# Patient Record
Sex: FEMALE | Race: WHITE | NOT HISPANIC OR LATINO | Employment: FULL TIME | URBAN - METROPOLITAN AREA
[De-identification: names, ages, dates, MRNs, and addresses within clinical notes are randomized per-mention and may not be internally consistent; named-entity substitution may affect disease eponyms.]

---

## 2019-05-15 ENCOUNTER — PATIENT OUTREACH (OUTPATIENT)
Dept: ADMINISTRATIVE | Facility: HOSPITAL | Age: 54
End: 2019-05-15

## 2019-05-29 PROBLEM — M67.90 TENDINOPATHY: Status: ACTIVE | Noted: 2019-05-29

## 2019-05-29 PROBLEM — E66.9 OBESITY, CLASS II, BMI 35-39.9: Status: ACTIVE | Noted: 2019-05-29

## 2019-05-29 PROBLEM — E66.812 OBESITY, CLASS II, BMI 35-39.9: Status: ACTIVE | Noted: 2019-05-29

## 2019-05-29 PROBLEM — Z72.0 TOBACCO ABUSE: Status: ACTIVE | Noted: 2019-05-29

## 2019-05-29 PROBLEM — E78.00 PURE HYPERCHOLESTEROLEMIA: Status: ACTIVE | Noted: 2019-05-29

## 2019-05-29 PROBLEM — M77.32 CALCANEAL SPUR OF LEFT FOOT: Status: ACTIVE | Noted: 2019-05-29

## 2019-09-06 PROBLEM — Z12.11 COLON CANCER SCREENING: Status: ACTIVE | Noted: 2019-09-06

## 2019-12-02 PROBLEM — R03.0 ELEVATED BLOOD PRESSURE READING: Status: ACTIVE | Noted: 2019-12-02

## 2019-12-02 PROBLEM — M76.62 LEFT ACHILLES TENDINITIS: Status: ACTIVE | Noted: 2019-12-02

## 2020-05-04 PROBLEM — F33.1 MODERATE EPISODE OF RECURRENT MAJOR DEPRESSIVE DISORDER: Status: ACTIVE | Noted: 2020-05-04

## 2020-07-13 ENCOUNTER — HOSPITAL ENCOUNTER (OUTPATIENT)
Dept: SLEEP MEDICINE | Facility: HOSPITAL | Age: 55
Discharge: HOME | End: 2020-07-13
Attending: NURSE PRACTITIONER
Payer: MEDICAID

## 2020-07-22 DIAGNOSIS — Z11.59 SPECIAL SCREENING EXAMINATION FOR UNSPECIFIED VIRAL DISEASE: ICD-10-CM

## 2020-07-29 DIAGNOSIS — Z11.59 SPECIAL SCREENING EXAMINATION FOR UNSPECIFIED VIRAL DISEASE: ICD-10-CM

## 2020-08-01 ENCOUNTER — LAB VISIT (OUTPATIENT)
Dept: INTERNAL MEDICINE | Facility: CLINIC | Age: 55
End: 2020-08-01
Payer: MEDICAID

## 2020-08-01 DIAGNOSIS — Z11.59 SPECIAL SCREENING EXAMINATION FOR UNSPECIFIED VIRAL DISEASE: ICD-10-CM

## 2020-08-01 PROCEDURE — U0003 INFECTIOUS AGENT DETECTION BY NUCLEIC ACID (DNA OR RNA); SEVERE ACUTE RESPIRATORY SYNDROME CORONAVIRUS 2 (SARS-COV-2) (CORONAVIRUS DISEASE [COVID-19]), AMPLIFIED PROBE TECHNIQUE, MAKING USE OF HIGH THROUGHPUT TECHNOLOGIES AS DESCRIBED BY CMS-2020-01-R: HCPCS

## 2020-08-02 LAB — SARS-COV-2 RNA RESP QL NAA+PROBE: NOT DETECTED

## 2020-08-03 ENCOUNTER — HOSPITAL ENCOUNTER (OUTPATIENT)
Dept: SLEEP MEDICINE | Facility: HOSPITAL | Age: 55
Discharge: HOME | End: 2020-08-03
Attending: NURSE PRACTITIONER
Payer: MEDICAID

## 2020-08-03 DIAGNOSIS — G47.33 OBSTRUCTIVE SLEEP APNEA (ADULT) (PEDIATRIC): ICD-10-CM

## 2020-08-03 PROCEDURE — 95811 POLYSOM 6/>YRS CPAP 4/> PARM: CPT

## 2020-09-08 PROBLEM — L02.91 ABSCESS: Status: ACTIVE | Noted: 2020-09-08

## 2021-01-15 ENCOUNTER — PATIENT MESSAGE (OUTPATIENT)
Dept: ADMINISTRATIVE | Facility: OTHER | Age: 56
End: 2021-01-15

## 2021-04-06 ENCOUNTER — IMMUNIZATION (OUTPATIENT)
Dept: PHARMACY | Facility: CLINIC | Age: 56
End: 2021-04-06
Payer: MEDICAID

## 2021-04-06 DIAGNOSIS — Z23 NEED FOR VACCINATION: Primary | ICD-10-CM

## 2021-04-29 PROBLEM — M79.672 LEFT FOOT PAIN: Status: ACTIVE | Noted: 2021-04-29

## 2021-04-29 PROBLEM — Z74.09 IMPAIRED FUNCTIONAL MOBILITY, BALANCE, GAIT, AND ENDURANCE: Status: ACTIVE | Noted: 2021-04-29

## 2021-05-04 ENCOUNTER — IMMUNIZATION (OUTPATIENT)
Dept: PHARMACY | Facility: CLINIC | Age: 56
End: 2021-05-04
Payer: MEDICAID

## 2021-05-04 DIAGNOSIS — Z23 NEED FOR VACCINATION: Primary | ICD-10-CM

## 2021-10-19 ENCOUNTER — OFFICE VISIT (OUTPATIENT)
Dept: URGENT CARE | Facility: CLINIC | Age: 56
End: 2021-10-19
Payer: MEDICAID

## 2021-10-19 VITALS
OXYGEN SATURATION: 97 % | BODY MASS INDEX: 39.01 KG/M2 | RESPIRATION RATE: 18 BRPM | HEIGHT: 62 IN | TEMPERATURE: 99 F | SYSTOLIC BLOOD PRESSURE: 145 MMHG | DIASTOLIC BLOOD PRESSURE: 99 MMHG | WEIGHT: 212 LBS | HEART RATE: 93 BPM

## 2021-10-19 DIAGNOSIS — R05.9 COUGH WITH FEVER: Primary | ICD-10-CM

## 2021-10-19 DIAGNOSIS — J32.9 BACTERIAL SINUSITIS: ICD-10-CM

## 2021-10-19 DIAGNOSIS — J41.1 PURULENT BRONCHITIS: ICD-10-CM

## 2021-10-19 DIAGNOSIS — R50.9 COUGH WITH FEVER: Primary | ICD-10-CM

## 2021-10-19 DIAGNOSIS — B96.89 BACTERIAL SINUSITIS: ICD-10-CM

## 2021-10-19 LAB
CTP QC/QA: YES
SARS-COV-2 RDRP RESP QL NAA+PROBE: NEGATIVE

## 2021-10-19 PROCEDURE — 99214 PR OFFICE/OUTPT VISIT, EST, LEVL IV, 30-39 MIN: ICD-10-PCS | Mod: S$GLB,,, | Performed by: NURSE PRACTITIONER

## 2021-10-19 PROCEDURE — U0002 COVID-19 LAB TEST NON-CDC: HCPCS | Mod: QW,S$GLB,, | Performed by: NURSE PRACTITIONER

## 2021-10-19 PROCEDURE — 99214 OFFICE O/P EST MOD 30 MIN: CPT | Mod: S$GLB,,, | Performed by: NURSE PRACTITIONER

## 2021-10-19 PROCEDURE — U0002: ICD-10-PCS | Mod: QW,S$GLB,, | Performed by: NURSE PRACTITIONER

## 2021-10-19 RX ORDER — PREDNISONE 20 MG/1
20 TABLET ORAL DAILY
Qty: 5 TABLET | Refills: 0 | Status: SHIPPED | OUTPATIENT
Start: 2021-10-19 | End: 2021-10-24

## 2021-10-19 RX ORDER — ALBUTEROL SULFATE 0.83 MG/ML
2.5 SOLUTION RESPIRATORY (INHALATION) EVERY 6 HOURS PRN
Qty: 1 BOX | Refills: 0 | Status: SHIPPED | OUTPATIENT
Start: 2021-10-19 | End: 2024-03-28

## 2021-10-19 RX ORDER — AMOXICILLIN AND CLAVULANATE POTASSIUM 875; 125 MG/1; MG/1
1 TABLET, FILM COATED ORAL 2 TIMES DAILY
Qty: 20 TABLET | Refills: 0 | Status: SHIPPED | OUTPATIENT
Start: 2021-10-19 | End: 2021-10-29

## 2022-03-28 PROBLEM — M25.612 DECREASED ROM OF LEFT SHOULDER: Status: ACTIVE | Noted: 2022-03-28

## 2022-03-28 PROBLEM — M25.512 LEFT SHOULDER PAIN: Status: ACTIVE | Noted: 2022-03-28

## 2022-05-02 ENCOUNTER — PATIENT MESSAGE (OUTPATIENT)
Dept: SMOKING CESSATION | Facility: CLINIC | Age: 57
End: 2022-05-02

## 2022-12-01 PROBLEM — M76.61 ACHILLES TENDINITIS OF RIGHT LOWER EXTREMITY: Status: ACTIVE | Noted: 2022-12-01

## 2023-03-17 ENCOUNTER — PATIENT MESSAGE (OUTPATIENT)
Dept: RESEARCH | Facility: HOSPITAL | Age: 58
End: 2023-03-17
Payer: MEDICAID

## 2023-06-13 ENCOUNTER — OFFICE VISIT (OUTPATIENT)
Dept: URGENT CARE | Facility: CLINIC | Age: 58
End: 2023-06-13
Payer: MEDICAID

## 2023-06-13 VITALS
TEMPERATURE: 99 F | OXYGEN SATURATION: 98 % | WEIGHT: 213 LBS | DIASTOLIC BLOOD PRESSURE: 80 MMHG | HEART RATE: 104 BPM | RESPIRATION RATE: 16 BRPM | HEIGHT: 62 IN | BODY MASS INDEX: 39.2 KG/M2 | SYSTOLIC BLOOD PRESSURE: 127 MMHG

## 2023-06-13 DIAGNOSIS — J06.9 ACUTE URI: ICD-10-CM

## 2023-06-13 DIAGNOSIS — Z11.59 SCREENING FOR VIRAL DISEASE: ICD-10-CM

## 2023-06-13 DIAGNOSIS — H66.003 NON-RECURRENT ACUTE SUPPURATIVE OTITIS MEDIA OF BOTH EARS WITHOUT SPONTANEOUS RUPTURE OF TYMPANIC MEMBRANES: Primary | ICD-10-CM

## 2023-06-13 DIAGNOSIS — J02.9 SORE THROAT: ICD-10-CM

## 2023-06-13 LAB
CTP QC/QA: YES
CTP QC/QA: YES
MOLECULAR STREP A: NEGATIVE
SARS-COV-2 AG RESP QL IA.RAPID: NEGATIVE

## 2023-06-13 PROCEDURE — 87811 SARS-COV-2 COVID19 W/OPTIC: CPT | Mod: QW,S$GLB,,

## 2023-06-13 PROCEDURE — 99214 PR OFFICE/OUTPT VISIT, EST, LEVL IV, 30-39 MIN: ICD-10-PCS | Mod: S$GLB,,,

## 2023-06-13 PROCEDURE — 87651 POCT STREP A MOLECULAR: ICD-10-PCS | Mod: QW,S$GLB,,

## 2023-06-13 PROCEDURE — 99214 OFFICE O/P EST MOD 30 MIN: CPT | Mod: S$GLB,,,

## 2023-06-13 PROCEDURE — 87811 SARS CORONAVIRUS 2 ANTIGEN POCT, MANUAL READ: ICD-10-PCS | Mod: QW,S$GLB,,

## 2023-06-13 PROCEDURE — 87651 STREP A DNA AMP PROBE: CPT | Mod: QW,S$GLB,,

## 2023-06-13 RX ORDER — TRIAMCINOLONE ACETONIDE 55 UG/1
2 SPRAY, METERED NASAL DAILY
Qty: 6.7 ML | Refills: 0 | Status: SHIPPED | OUTPATIENT
Start: 2023-06-13 | End: 2023-12-21

## 2023-06-13 RX ORDER — CETIRIZINE HYDROCHLORIDE 10 MG/1
10 TABLET ORAL DAILY
Qty: 30 TABLET | Refills: 0 | Status: SHIPPED | OUTPATIENT
Start: 2023-06-13 | End: 2024-06-12

## 2023-06-13 RX ORDER — GUAIFENESIN 600 MG/1
1200 TABLET, EXTENDED RELEASE ORAL 2 TIMES DAILY
Qty: 40 TABLET | Refills: 0 | Status: SHIPPED | OUTPATIENT
Start: 2023-06-13 | End: 2023-06-23

## 2023-06-13 RX ORDER — AMOXICILLIN AND CLAVULANATE POTASSIUM 875; 125 MG/1; MG/1
1 TABLET, FILM COATED ORAL EVERY 12 HOURS
Qty: 20 TABLET | Refills: 0 | Status: SHIPPED | OUTPATIENT
Start: 2023-06-13 | End: 2023-06-23

## 2023-06-13 NOTE — LETTER
June 13, 2023      [place] [facility]  [address], [address]  [place] [address]  Phone: [phone]  Fax: [phone]       Patient: Chiquis Man   YOB: 1965  Date of Visit: 06/13/2023    To Whom It May Concern:    Corby Man  was at [facility] on 06/13/2023. The patient may return to work/school on 6/16/23 with no restrictions. If you have any questions or concerns, or if I can be of further assistance, please do not hesitate to contact me.    Sincerely,    Racheal Patel PA-C

## 2023-06-13 NOTE — PROGRESS NOTES
"Subjective:      Patient ID: Chiquis Man is a 57 y.o. female.    Vitals:  height is 5' 2" (1.575 m) and weight is 96.6 kg (213 lb). Her tympanic temperature is 98.8 °F (37.1 °C). Her blood pressure is 127/80 and her pulse is 104. Her respiration is 16 and oxygen saturation is 98%.     Chief Complaint: Sinus Problem    Sinus Problem  This is a new problem. The current episode started yesterday. The problem has been gradually worsening since onset. The maximum temperature recorded prior to her arrival was 101 - 101.9 F. Her pain is at a severity of 2/10. The pain is mild. Associated symptoms include chills, congestion, ear pain, sneezing and a sore throat. Pertinent negatives include no coughing, headaches or neck pain. (Watery eyes, nausea) Past treatments include acetaminophen. The treatment provided mild relief.     Constitution: Positive for chills and fever.   HENT:  Positive for ear pain, congestion and sore throat.         +pain with swallowing   Neck: Negative for neck pain.   Respiratory:  Negative for cough.    Allergic/Immunologic: Positive for sneezing.   Neurological:  Negative for headaches.    Objective:     Physical Exam   Constitutional: She is oriented to person, place, and time. She appears well-developed. She is cooperative.  Non-toxic appearance. She does not appear ill. No distress.   HENT:   Head: Normocephalic and atraumatic.   Ears:   Right Ear: Hearing, external ear and ear canal normal.   Left Ear: Hearing, external ear and ear canal normal.      Comments: Suppurative effusions bilaterally.   Nose: Nose normal. No mucosal edema, rhinorrhea or nasal deformity. No epistaxis. Right sinus exhibits no maxillary sinus tenderness and no frontal sinus tenderness. Left sinus exhibits no maxillary sinus tenderness and no frontal sinus tenderness.   Mouth/Throat: Uvula is midline and mucous membranes are normal. Mucous membranes are moist. No trismus in the jaw. Normal dentition. No uvula " swelling. Posterior oropharyngeal erythema (with petechiae) present. No oropharyngeal exudate or posterior oropharyngeal edema.      Comments: Uvula is midline. No exudates noted. No drooling, muffled voice.   Eyes: Conjunctivae and lids are normal. No scleral icterus.      Comments: Watery eyes.   Neck: Trachea normal and phonation normal. Neck supple. No edema present. No erythema present. No neck rigidity present.   Cardiovascular: Normal rate and regular rhythm.   Pulmonary/Chest: Effort normal and breath sounds normal. No respiratory distress. She has no decreased breath sounds. She has no wheezes. She has no rhonchi.   Abdominal: Normal appearance.   Musculoskeletal: Normal range of motion.         General: No deformity. Normal range of motion.   Lymphadenopathy:     She has no cervical adenopathy.   Neurological: She is alert and oriented to person, place, and time. She exhibits normal muscle tone. Coordination normal.   Skin: Skin is warm, dry, intact, not diaphoretic and not pale.   Psychiatric: Her speech is normal and behavior is normal. Judgment and thought content normal.   Nursing note and vitals reviewed.  Results for orders placed or performed in visit on 06/13/23   SARS Coronavirus 2 Antigen, POCT Manual Read   Result Value Ref Range    SARS Coronavirus 2 Antigen Negative Negative     Acceptable Yes    POCT Strep A, Molecular   Result Value Ref Range    Molecular Strep A, POC Negative Negative     Acceptable Yes        Assessment:     1. Non-recurrent acute suppurative otitis media of both ears without spontaneous rupture of tympanic membranes    2. Screening for viral disease    3. Sore throat    4. Acute URI        Plan:       Non-recurrent acute suppurative otitis media of both ears without spontaneous rupture of tympanic membranes  -     amoxicillin-clavulanate 875-125mg (AUGMENTIN) 875-125 mg per tablet; Take 1 tablet by mouth every 12 (twelve) hours. for 10 days   Dispense: 20 tablet; Refill: 0    Screening for viral disease  -     SARS Coronavirus 2 Antigen, POCT Manual Read    Sore throat  -     POCT Strep A, Molecular    Acute URI  -     triamcinolone (NASACORT) 55 mcg nasal inhaler; 2 sprays by Nasal route once daily.  Dispense: 6.7 mL; Refill: 0  -     cetirizine (ZYRTEC) 10 MG tablet; Take 1 tablet (10 mg total) by mouth once daily.  Dispense: 30 tablet; Refill: 0  -     guaiFENesin (MUCINEX) 600 mg 12 hr tablet; Take 2 tablets (1,200 mg total) by mouth 2 (two) times daily. for 10 days  Dispense: 40 tablet; Refill: 0           Covid and strep negative.   Please drink plenty of fluids. Please get plenty of rest.  Please return here or go to the Emergency Department for any concerns or worsening of condition.  If you were prescribed antibiotics, please take them to completion.  If not allergic, please take over the counter Tylenol (Acetaminophen) and/or Motrin (Ibuprofen) as directed for control of pain and/or fever.  Please follow up with your primary care doctor or specialist as needed.    If you  smoke, please stop smoking. Discussed with patient the importance of f/u with their primary care provider. Urged to go to the ER for any worsening signs or symptoms.

## 2023-06-15 ENCOUNTER — OFFICE VISIT (OUTPATIENT)
Dept: URGENT CARE | Facility: CLINIC | Age: 58
End: 2023-06-15
Payer: MEDICAID

## 2023-06-15 VITALS
HEART RATE: 99 BPM | HEIGHT: 62 IN | WEIGHT: 213 LBS | RESPIRATION RATE: 17 BRPM | TEMPERATURE: 98 F | OXYGEN SATURATION: 97 % | BODY MASS INDEX: 39.2 KG/M2 | DIASTOLIC BLOOD PRESSURE: 83 MMHG | SYSTOLIC BLOOD PRESSURE: 132 MMHG

## 2023-06-15 DIAGNOSIS — J01.91 ACUTE RECURRENT SINUSITIS, UNSPECIFIED LOCATION: ICD-10-CM

## 2023-06-15 DIAGNOSIS — H65.06 RECURRENT ACUTE SEROUS OTITIS MEDIA OF BOTH EARS: Primary | ICD-10-CM

## 2023-06-15 DIAGNOSIS — R52 BODY ACHES: ICD-10-CM

## 2023-06-15 LAB
CTP QC/QA: YES
CTP QC/QA: YES
POC MOLECULAR INFLUENZA A AGN: NEGATIVE
POC MOLECULAR INFLUENZA B AGN: NEGATIVE
SARS-COV-2 AG RESP QL IA.RAPID: NEGATIVE

## 2023-06-15 PROCEDURE — 87811 SARS CORONAVIRUS 2 ANTIGEN POCT, MANUAL READ: ICD-10-PCS | Mod: QW,S$GLB,,

## 2023-06-15 PROCEDURE — 87811 SARS-COV-2 COVID19 W/OPTIC: CPT | Mod: QW,S$GLB,,

## 2023-06-15 PROCEDURE — 87502 POCT INFLUENZA A/B MOLECULAR: ICD-10-PCS | Mod: QW,S$GLB,,

## 2023-06-15 PROCEDURE — 99214 OFFICE O/P EST MOD 30 MIN: CPT | Mod: S$GLB,,,

## 2023-06-15 PROCEDURE — 87502 INFLUENZA DNA AMP PROBE: CPT | Mod: QW,S$GLB,,

## 2023-06-15 PROCEDURE — 99214 PR OFFICE/OUTPT VISIT, EST, LEVL IV, 30-39 MIN: ICD-10-PCS | Mod: S$GLB,,,

## 2023-06-15 RX ORDER — DEXAMETHASONE SODIUM PHOSPHATE 100 MG/10ML
10 INJECTION INTRAMUSCULAR; INTRAVENOUS
Status: CANCELLED | OUTPATIENT
Start: 2023-06-15 | End: 2023-06-15

## 2023-06-15 RX ORDER — DEXAMETHASONE SODIUM PHOSPHATE 100 MG/10ML
10 INJECTION INTRAMUSCULAR; INTRAVENOUS
Status: COMPLETED | OUTPATIENT
Start: 2023-06-15 | End: 2023-06-15

## 2023-06-15 RX ADMIN — DEXAMETHASONE SODIUM PHOSPHATE 10 MG: 100 INJECTION INTRAMUSCULAR; INTRAVENOUS at 12:06

## 2023-06-15 NOTE — PROGRESS NOTES
"Subjective:      Patient ID: Chiquis Man is a 57 y.o. female.    Vitals:  height is 5' 2" (1.575 m) and weight is 96.6 kg (213 lb). Her oral temperature is 98 °F (36.7 °C). Her blood pressure is 132/83 and her pulse is 99. Her respiration is 17 and oxygen saturation is 97%.     Chief Complaint: Sinus Problem    Patient was seen in clinic 2 days ago for congestion, sore throat, fever. Pt was negative for strep and covid. Reports she is feeling "achy" and congestion with sore throat and post nasal drainage. Patient reprots she has not repeated a Covid test since visit.     Sinus Problem  This is a new problem. Episode onset: 3 days. The problem has been waxing and waning since onset. The maximum temperature recorded prior to her arrival was 101 - 101.9 F. Her pain is at a severity of 4/10. The pain is mild. Associated symptoms include chills, congestion, headaches, sinus pressure, sneezing and a sore throat. Pertinent negatives include no coughing, ear pain or neck pain. (Body aches, nausea, sweating) Treatments tried: mucinex, antibiotics, tylenol. The treatment provided no relief.     Constitution: Positive for chills.   HENT:  Positive for congestion, sinus pressure and sore throat. Negative for ear pain.    Neck: Negative for neck pain.   Respiratory:  Negative for cough.    Allergic/Immunologic: Positive for sneezing.   Neurological:  Positive for headaches.    Objective:     Physical Exam   Constitutional: She is oriented to person, place, and time. She appears well-developed. She is cooperative.  Non-toxic appearance. She does not appear ill. No distress.   HENT:   Head: Normocephalic and atraumatic.   Ears:   Right Ear: Hearing, external ear and ear canal normal.   Left Ear: Hearing, external ear and ear canal normal.      Comments: Serous fluid level at right TM. Left TM is injected with serous effusion.   Nose: Mucosal edema and congestion present. No rhinorrhea or nasal deformity. No epistaxis. " Right sinus exhibits maxillary sinus tenderness and frontal sinus tenderness. Left sinus exhibits maxillary sinus tenderness and frontal sinus tenderness.   Mouth/Throat: Uvula is midline and mucous membranes are normal. Mucous membranes are moist. No trismus in the jaw. Normal dentition. No uvula swelling. Posterior oropharyngeal erythema present. No oropharyngeal exudate or posterior oropharyngeal edema.   Eyes: Conjunctivae and lids are normal. No scleral icterus.   Neck: Trachea normal and phonation normal. Neck supple. No edema present. No erythema present. No neck rigidity present.   Cardiovascular: Normal rate and regular rhythm.   Pulmonary/Chest: Effort normal and breath sounds normal. No stridor. No respiratory distress. She has no decreased breath sounds. She has no wheezes. She has no rhonchi. She has no rales.   Abdominal: Normal appearance.   Musculoskeletal: Normal range of motion.         General: No deformity. Normal range of motion.   Neurological: She is alert and oriented to person, place, and time. She exhibits normal muscle tone. Coordination normal.   Skin: Skin is warm, dry, intact, not diaphoretic and not pale.   Psychiatric: Her speech is normal and behavior is normal. Judgment and thought content normal.   Nursing note and vitals reviewed.  Results for orders placed or performed in visit on 06/15/23   POCT Influenza A/B MOLECULAR   Result Value Ref Range    POC Molecular Influenza A Ag Negative Negative, Not Reported    POC Molecular Influenza B Ag Negative Negative, Not Reported     Acceptable Yes    SARS Coronavirus 2 Antigen, POCT Manual Read   Result Value Ref Range    SARS Coronavirus 2 Antigen Negative Negative     Acceptable Yes        Assessment:     1. Recurrent acute serous otitis media of both ears    2. Body aches    3. Acute recurrent sinusitis, unspecified location        Plan:       Recurrent acute serous otitis media of both ears  -      dexAMETHasone injection 10 mg    Body aches  -     POCT Influenza A/B MOLECULAR  -     SARS Coronavirus 2 Antigen, POCT Manual Read    Acute recurrent sinusitis, unspecified location  -     dexAMETHasone injection 10 mg      VS reviewed.     Covid was done at last visit and was negative. Patient would like to repeat Covid again today. Covid today is negative. Flu negative.     Discussed with patient to continue Augmentin antibiotics and complete full course. Gargle with warm salt water daily. Nasal saline rinses for congestion. Continue Mucinex and Zyrtec daily as prescribed. Increase oral hydration.     Steroid stewardship discussed with patient. Patient understood the risk vs benefits of steroid injection use. Patient was educated on the adverse effects and long-term effects of injectable steroid use. Patient would like steroid injection to be given today.     You received a steroid injection today - this can elevate your blood pressure, elevate your blood sugar, cause weight gain, nervous energy, redness to the face and dimpling of the skin where the injection was administered.    Discussed with patient the importance of f/u with their primary care provider in 2-3 days. Urged to go to the ER for any worsening signs or symptoms.

## 2023-06-15 NOTE — LETTER
Katia 15, 2023      [place] [facility]  [address], [address]  [place] [address]  Phone: [phone]  Fax: [phone]       Patient: Chiquis Man   YOB: 1965  Date of Visit: 06/15/2023    To Whom It May Concern:    Corby Man  was at [facility] on 06/15/2023. The patient may return to work/school on 6/19/23 with no restrictions. If you have any questions or concerns, or if I can be of further assistance, please do not hesitate to contact me.    Sincerely,    Racheal Patel PA-C

## 2024-01-04 ENCOUNTER — OFFICE VISIT (OUTPATIENT)
Dept: URGENT CARE | Facility: CLINIC | Age: 59
End: 2024-01-04
Payer: COMMERCIAL

## 2024-01-04 VITALS
HEIGHT: 62 IN | WEIGHT: 215 LBS | TEMPERATURE: 97 F | SYSTOLIC BLOOD PRESSURE: 186 MMHG | BODY MASS INDEX: 39.56 KG/M2 | HEART RATE: 75 BPM | RESPIRATION RATE: 18 BRPM | DIASTOLIC BLOOD PRESSURE: 97 MMHG | OXYGEN SATURATION: 97 %

## 2024-01-04 DIAGNOSIS — R10.9 FLANK PAIN: Primary | ICD-10-CM

## 2024-01-04 DIAGNOSIS — M54.50 ACUTE LOW BACK PAIN WITHOUT SCIATICA, UNSPECIFIED BACK PAIN LATERALITY: ICD-10-CM

## 2024-01-04 LAB
BILIRUB UR QL STRIP: NEGATIVE
GLUCOSE UR QL STRIP: NEGATIVE
KETONES UR QL STRIP: NEGATIVE
LEUKOCYTE ESTERASE UR QL STRIP: NEGATIVE
PH, POC UA: 5.5 (ref 5–8)
POC BLOOD, URINE: NEGATIVE
POC NITRATES, URINE: POSITIVE
PROT UR QL STRIP: NEGATIVE
SP GR UR STRIP: 1.02 (ref 1–1.03)
UROBILINOGEN UR STRIP-ACNC: ABNORMAL (ref 0.1–1.1)

## 2024-01-04 PROCEDURE — 96372 THER/PROPH/DIAG INJ SC/IM: CPT | Mod: S$GLB,,, | Performed by: FAMILY MEDICINE

## 2024-01-04 PROCEDURE — 99214 OFFICE O/P EST MOD 30 MIN: CPT | Mod: 25,S$GLB,, | Performed by: FAMILY MEDICINE

## 2024-01-04 PROCEDURE — 81003 URINALYSIS AUTO W/O SCOPE: CPT | Mod: QW,S$GLB,, | Performed by: FAMILY MEDICINE

## 2024-01-04 RX ORDER — NAPROXEN 500 MG/1
500 TABLET ORAL 2 TIMES DAILY WITH MEALS
Qty: 20 TABLET | Refills: 0 | OUTPATIENT
Start: 2024-01-04 | End: 2024-01-13

## 2024-01-04 RX ORDER — CHLORZOXAZONE 500 MG/1
500 TABLET ORAL 4 TIMES DAILY PRN
Qty: 40 TABLET | Refills: 0 | Status: SHIPPED | OUTPATIENT
Start: 2024-01-04 | End: 2024-01-14

## 2024-01-04 RX ORDER — KETOROLAC TROMETHAMINE 30 MG/ML
30 INJECTION, SOLUTION INTRAMUSCULAR; INTRAVENOUS ONCE
Status: COMPLETED | OUTPATIENT
Start: 2024-01-04 | End: 2024-01-04

## 2024-01-04 RX ADMIN — KETOROLAC TROMETHAMINE 30 MG: 30 INJECTION, SOLUTION INTRAMUSCULAR; INTRAVENOUS at 02:01

## 2024-01-04 NOTE — LETTER
January 4, 2024  Chiquis Man  [address] [place] [address]                [place] [facility]  [address], [address]  [place] [address]  Phone: [phone]  Fax: [phone] Chiquis Man was seen and treated in our Urgent Care department on 1/4/2024. She may return to work in 2 - 3 days.      If you have any questions or concerns, please don't hesitate to call.        Sincerely,        Brandon Mendenhall MD

## 2024-01-04 NOTE — PROGRESS NOTES
"Subjective:      Patient ID: Chiquis Man is a 58 y.o. female.    Vitals:  height is 5' 2" (1.575 m) and weight is 97.5 kg (215 lb). Her tympanic temperature is 97.1 °F (36.2 °C). Her blood pressure is 186/97 (abnormal) and her pulse is 75. Her respiration is 18 and oxygen saturation is 97%.     Chief Complaint: Back Pain    Pt reports hx of kidney stones.     Back Pain  This is a new problem. Episode onset: 4 days. The problem occurs constantly. The problem has been gradually worsening since onset. The pain is present in the lumbar spine. The quality of the pain is described as aching and stabbing. The pain does not radiate. The pain is at a severity of 7/10. The pain is moderate. The pain is The same all the time. The symptoms are aggravated by bending, standing, twisting and sitting. Stiffness is present: none. Pertinent negatives include no fever, headaches, leg pain, numbness, pelvic pain or perianal numbness. (Decreased urination, fkank pain, chills   ) Treatments tried: tylenol. The treatment provided no relief.       Constitution: Negative. Negative for fever.   HENT: Negative.     Cardiovascular: Negative.    Eyes: Negative.    Respiratory: Negative.     Gastrointestinal: Negative.    Endocrine: negative.   Genitourinary: Negative.  Negative for pelvic pain.   Musculoskeletal:  Positive for back pain.   Skin: Negative.    Allergic/Immunologic: Negative.    Neurological: Negative.  Negative for headaches and numbness.   Hematologic/Lymphatic: Negative.    Psychiatric/Behavioral: Negative.        Objective:     Physical Exam   Constitutional: She is oriented to person, place, and time. She appears well-developed. She is cooperative. No distress.   HENT:   Head: Normocephalic and atraumatic.   Nose: Nose normal.   Mouth/Throat: Oropharynx is clear and moist and mucous membranes are normal.   Eyes: Conjunctivae and lids are normal.   Neck: Trachea normal and phonation normal. Neck supple. "   Cardiovascular: Normal rate, regular rhythm, normal heart sounds and normal pulses.   Pulmonary/Chest: Effort normal and breath sounds normal.   Abdominal: Normal appearance and bowel sounds are normal. She exhibits no mass. Soft.   Musculoskeletal:         General: No deformity.      Lumbar back: She exhibits decreased range of motion and tenderness.        Back:    Neurological: She is alert and oriented to person, place, and time. She has normal strength and normal reflexes. No sensory deficit.   Skin: Skin is warm, dry, intact and not diaphoretic.   Psychiatric: Her speech is normal and behavior is normal. Judgment and thought content normal.   Nursing note and vitals reviewed.    Results for orders placed or performed in visit on 01/04/24   POCT Urinalysis, Dipstick, Automated, W/O Scope   Result Value Ref Range    POC Blood, Urine Negative Negative, Positive Slide, Positive Tube    POC Bilirubin, Urine Negative Negative, Positive Slide, Positive Tube    POC Urobilinogen, Urine norm 0.1 - 1.1    POC Ketones, Urine Negative Negative, Positive Slide, Positive Tube    POC Protein, Urine Negative Negative, Positive Slide, Positive Tube    POC Nitrates, Urine Positive (A) Negative, Positive Slide, Positive Tube    POC Glucose, Urine Negative Negative, Positive Slide, Positive Tube    pH, UA 5.5 5 - 8    POC Specific Gravity, Urine 1.025 1.003 - 1.029    POC Leukocytes, Urine Negative Negative, Positive Slide, Positive Tube         Assessment:     1. Flank pain    2. Acute low back pain without sciatica, unspecified back pain laterality        Plan:       Flank pain  -     POCT Urinalysis, Dipstick, Automated, W/O Scope    Acute low back pain without sciatica, unspecified back pain laterality  -     ketorolac injection 30 mg  -     naproxen (NAPROSYN) 500 MG tablet; Take 1 tablet (500 mg total) by mouth 2 (two) times daily with meals.  Dispense: 20 tablet; Refill: 0  -     chlorzoxazone (PARAFON FORTE) 500 mg Tab;  Take 1 tablet (500 mg total) by mouth 4 (four) times daily as needed.  Dispense: 40 tablet; Refill: 0      Please drink plenty of fluids.  Please get plenty of rest.  Please return here or go to the Emergency Department for any concerns or worsening of condition.  If you were prescribed a narcotic medication, do not drive or operate heavy equipment or machinery while taking these medications.  If you were not prescribed an anti-inflammatory medication, and if you do not have any history of stomach/intestinal ulcers, or kidney disease, or are not taking a blood thinner such as Coumadin, Plavix, Pradaxa, Eloquis, or Xaralta for example, it is OK to take over the counter Ibuprofen or Advil or Motrin or Aleve as directed.  Do not take these medications on an empty stomach.  If you lose control of your bowel and/or bladder, please go to the nearest Emergency Department immediately.  If you lose sensation in between your legs by your genitalia and/or rectum, please go to the nearest Emergency Department immediately.  If you lose control or sensation of any extremity, please go to the nearest Emergency Department immediately.    Moist heat (heating Pad) several times a day to back for relief and comfort.  If you  smoke, please stop smoking.    Please follow up with your primary care doctor or specialist as needed.  William Walton II, NP  [phone]    You must understand that you have received treatment at an Urgent Care facility only, and that you may be  released before all of your medical problems are known or treated. Urgent Care facilities are not equipped to  handle life threatening emergencies. It is recommended that you seek care at an Emergency Department for  further evaluation of worsening or concerning symptoms, or possibly life threatening conditions as  discussed.

## 2024-01-04 NOTE — PATIENT INSTRUCTIONS
Please drink plenty of fluids.  Please get plenty of rest.  Please return here or go to the Emergency Department for any concerns or worsening of condition.  If you were prescribed a narcotic medication, do not drive or operate heavy equipment or machinery while taking these medications.  If you were not prescribed an anti-inflammatory medication, and if you do not have any history of stomach/intestinal ulcers, or kidney disease, or are not taking a blood thinner such as Coumadin, Plavix, Pradaxa, Eloquis, or Xaralta for example, it is OK to take over the counter Ibuprofen or Advil or Motrin or Aleve as directed.  Do not take these medications on an empty stomach.  If you lose control of your bowel and/or bladder, please go to the nearest Emergency Department immediately.  If you lose sensation in between your legs by your genitalia and/or rectum, please go to the nearest Emergency Department immediately.  If you lose control or sensation of any extremity, please go to the nearest Emergency Department immediately.    Moist heat (heating Pad) several times a day to back for relief and comfort.  If you  smoke, please stop smoking.    Please follow up with your primary care doctor or specialist as needed.  William Walton II, NP  [phone]    You must understand that you have received treatment at an Urgent Care facility only, and that you may be  released before all of your medical problems are known or treated. Urgent Care facilities are not equipped to  handle life threatening emergencies. It is recommended that you seek care at an Emergency Department for  further evaluation of worsening or concerning symptoms, or possibly life threatening conditions as  Discussed.    General Neck and Back Pain    Both neck and back pain are usually caused by injury to the muscles or ligaments of the spine. Sometimes the disks that separate each bone of the spine may cause pain by pressing on a nearby nerve. Back and neck  pain may appear after a sudden twisting or bending force (such as in a car accident), or sometimes after a simple awkward movement. In either case, muscle spasm is often present and adds to the pain.  Acute neck and back pain usually gets better in 1 to 2 weeks. Pain related to disk disease, arthritis in the spinal joints or spinal stenosis (narrowing of the spinal canal) can become chronic and last for months or years.  Back and neck pain are common problems. Most people feel better in 1 or 2 weeks, and most of the rest in 1 to 2 months. Most people can remain active.  People experience and describe pain differently.  Pain can be sharp, stabbing, shooting, aching, cramping, or burning  Movement, standing, bending, lifting, sitting, or walking may worsen the pain  Pain can be localized to one spot or area, or it can be more generalized  Pain can spread or radiate upwards, downwards, to the front, or go down your arms  Muscle spasm may occur.  Most of the time mechanical problems with the muscles or spine cause the pain. it is usually caused by an injury, whether known or not, to the muscles or ligaments. While illnesses can cause back pain, it is usually not caused by a serious illness. Pain is usually related to physical activity, whether sports, exercise, work, or normal activity. Sometimes it can occur without an identifiable cause. This can happen simply by stretching or moving wrong, without noting pain at the time. Other causes include:  Overexertion, lifting, pushing, pulling incorrectly or too aggressively.  Sudden twisting, bending or stretching from an accident (car or fall), or accidental movement.  Poor posture  Poor conditioning, lack of regular exercise  Spinal disc disease or arthritis  Stress  Pregnancy, or illness like appendicitis, bladder or kidney infection, pelvic infections   Home care  For neck pain: Use a comfortable pillow that supports the head and keeps the spine in a neutral position. The  position of the head should not be tilted forward or backward.  When in bed, try to find a position of comfort. A firm mattress is best. Try lying flat on your back with pillows under your knees. You can also try lying on your side with your knees bent up towards your chest and a pillow between your knees.  At first, do not try to stretch out the sore spots. If there is a strain, it is not like the good soreness you get after exercising without an injury. In this case, stretching may make it worse.  Avoid prolonged sitting, long car rides or travel. This puts more stress on the lower back than standing or walking.  During the first 24 to 72 hours after an injury, apply an ice pack to the painful area for 20 minutes and then remove it for 20 minutes over a period of 60 to 90 minutes or several times a day.   You can alternate ice and heat therapies. Talk with your healthcare provider about the best treatment for your back or neck pain. As a safety precaution, do not use a heating pad at bedtime. Sleeping with a heating pad can lead to skin burns or tissue damage.  Therapeutic massage can help relax the back and neck muscles without stretching them.  Be aware of safe lifting methods and do not lift anything over 15 pounds until all the pain is gone.  Medications  Talk to your healthcare provider before using medicine, especially if you have other medical problems or are taking other medicines.  You may use over-the-counter medicine to control pain, unless another pain medicine was prescribed. If you have chronic conditions like diabetes, liver or kidney disease, stomach ulcers,  gastrointestinal bleeding, or are taking blood thinner medicines.  Be careful if you are given pain medicines, narcotics, or medicine for muscle spasm. They can cause drowsiness, and can affect your coordination, reflexes, and judgment. Do not drive or operate heavy machinery.  Follow-up care  Follow up with your healthcare provider, or as  advised. Physical therapy or further tests may be needed.  If X-rays were taken, you will be notified of any new findings that may affect your care.  Call 911  Seek emergency medical care if any of the following occur:  Trouble breathing  Confusion  Very drowsy or trouble awakening  Fainting or loss of consciousness  Rapid or very slow heart rate  Loss of bowel or bladder control  When to seek medical advice  Call your healthcare provider right away if any of these occur:  Pain becomes worse or spreads into your arms or legs  Weakness, numbness or pain in one or both arms or legs  Numbness in the groin area  Difficulty walking  Fever of 100.4ºF (38ºC) or higher, or as directed by your healthcare provider  Date Last Reviewed: 7/1/2016 © 2000-2016 [employer]. [address] [place] [id number]. All rights reserved. This information is not intended as a substitute for professional medical care. Always follow your healthcare professional's instructions.      Back Pain (Acute or Chronic)    Back pain is one of the most common problems. The good news is that most people feel better in 1 to 2 weeks, and most of the rest in 1 to 2 months. Most people can remain active.  People experience and describe pain differently; not everyone is the same.  The pain can be sharp, stabbing, shooting, aching, cramping or burning.  Movement, standing, bending, lifting, sitting, or walking may worsen pain.  It can be localized to one spot or area, or it can be more generalized.  It can spread or radiate upwards, to the front, or go down your arms or legs (sciatica).  It can cause muscle spasm.  Most of the time, mechanical problems with the muscles or spine cause the pain. Mechanical problems are usually caused by an injury to the muscles or ligaments. While illness can cause back pain, it is usually not caused by a serious illness. Mechanical problems include:   Physical activity such as sports, exercise, work, or  normal activity  Overexertion, lifting, pushing, pulling incorrectly or too aggressively  Sudden twisting, bending, or stretching from an accident, or accidental movement  Poor posture  Stretching or moving wrong, without noticing pain at the time  Poor coordination, lack of regular exercise (check with your doctor about this)  Spinal disc disease or arthritis  Stress  Pain can also be related to pregnancy, or illness like appendicitis, bladder or kidney infections, pelvic infections, and many other things.  Acute back pain usually gets better in 1 to 2 weeks. Back pain related to disk disease, arthritis in the spinal joints or spinal stenosis (narrowing of the spinal canal) can become chronic and last for months or years.  Unless you had a physical injury (for example, a car accident or fall) X-rays are usually not needed for the initial evaluation of back pain. If pain continues and does not respond to medical treatment, X-rays and other tests may be needed.  Home care  Try these home care recommendations:  When in bed, try to find a position of comfort. A firm mattress is best. Try lying flat on your back with pillows under your knees. You can also try lying on your side with your knees bent up towards your chest and a pillow between your knees.  At first, do not try to stretch out the sore spots. If there is a strain, it is not like the good soreness you get after exercising without an injury. In this case, stretching may make it worse.  Avoid prolong sitting, long car rides, or travel. This puts more stress on the lower back than standing or walking.  During the first 24 to 72 hours after an acute injury or flare up of chronic back pain, apply an ice pack to the painful area for 20 minutes and then remove it for 20 minutes. Do this over a period of 60 to 90 minutes or several times a day. This will reduce swelling and pain. Wrap the ice pack in a thin towel or plastic to protect your skin.  You can start with  ice, then switch to heat. Heat (hot shower, hot bath, or heating pad) reduces pain and works well for muscle spasms. Heat can be applied to the painful area for 20 minutes then remove it for 20 minutes. Do this over a period of 60 to 90 minutes or several times a day. Do not sleep on a heating pad. It can lead to skin burns or tissue damage.  You can alternate ice and heat therapy. Talk with your doctor about the best treatment for your back pain.  Therapeutic massage can help relax the back muscles without stretching them.  Be aware of safe lifting methods and do not lift anything without stretching first.  Medicines  Talk to your doctor before using medicine, especially if you have other medical problems or are taking other medicines.  You may use over-the-counter medicine as directed on the bottle to control pain, unless another pain medicine was prescribed. If you have chronic conditions like diabetes, liver or kidney disease, stomach ulcers, or gastrointestinal bleeding, or are taking blood thinners, talk to your doctor before taking any medicine.  Be careful if you are given a prescription medicines, narcotics, or medicine for muscle spasms. They can cause drowsiness, affect your coordination, reflexes, and judgement. Do not drive or operate heavy machinery.  Follow-up care  Follow up with your healthcare provider, or as advised.   A radiologist will review any X-rays that were taken. Your provide will notify you of any new findings that may affect your care.  Call 911  Call emergency services if any of the following occur:  Trouble breathing  Confusion  Very drowsy or trouble awakening  Fainting or loss of consciousness  Rapid or very slow heart rate  Loss of bowel or bladder control  When to seek medical advice  Call your healthcare provider right away if any of these occur:   Pain becomes worse or spreads to your legs  Weakness or numbness in one or both legs  Numbness in the groin or genital area  Date  Last Reviewed: 7/1/2016 © 2000-2016 [email or web address]. [address] [place] [address], [place] [address]. All rights reserved. This information is not intended as a substitute for professional medical care. Always follow your healthcare professional's instructions.      Back Care Tips    Caring for your back  These are things you can do to prevent a recurrence of acute back pain and to reduce symptoms from chronic back pain:  Maintain a healthy weight. If you are overweight, losing weight will help most types of back pain.  Exercise is an important part of recovery from most types of back pain. The muscles behind and in front of the spine support the back. This means strengthening both the back muscles and the abdominal muscles will provide better support for your spine.   Swimming and brisk walking are good overall exercises to improve your fitness level.  Practice safe lifting methods (below).  Practice good posture when sitting, standing and walking. Avoid prolonged sitting. This puts more stress on the lower back than standing or walking.  Wear quality shoes with sufficient arch support. Foot and ankle alignment can affect back symptoms. Women should avoid wearing high heels.  Therapeutic massage can help relax the back muscles without stretching them.  During the first 24 to 72 hours after an acute injury or flare-up of chronic back pain, apply an ice pack to the painful area for 20 minutes and then remove it for 20 minutes, over a period of 60 to 90 minutes, or several times a day. As a safety precaution, do not use a heating pad at bedtime. Sleeping on a heating pad can lead to skin burns or tissue damage.  You can alternate ice and heat therapies.  Medications  Talk to your healthcare provider before using medicines, especially if you have other medical problems or are taking other medicines.  You may use acetaminophen or ibuprofen to control pain, unless your healthcare provider prescribed other pain  medicine. If you have chronic conditions like diabetes, liver or kidney disease, stomach ulcers, or gastrointestinal bleeding, or are taking blood thinners, talk with your healthcare provider before taking any medicines.  Be careful if you are given prescription pain medicines, narcotics, or medicine for muscle spasm. They can cause drowsiness, affect your coordination, reflexes, and judgment. Do not drive or operate heavy machinery while taking these types of medicines. Take prescription pain medicine only as prescribed by your healthcare provider.  Lumbar stretch  Here is a simple stretching exercise that will help relax muscle spasm and keep your back more limber. If exercise makes your back pain worse, dont do it.  Lie on your back with your knees bent and both feet on the ground.  Slowly raise your left knee to your chest as you flatten your lower back against the floor. Hold for 5 seconds.  Relax and repeat the exercise with your right knee.  Do 10 of these exercises for each leg.  Safe lifting method  Dont bend over at the waist to lift an object off the floor.  Instead, bend your knees and hips in a squat.   Keep your back and head upright  Hold the object close to your body, directly in front of you.  Straighten your legs to lift the object.   Lower the object to the floor in the reverse fashion.  If you must slide something across the floor, push it.  Posture tips  Sitting  Sit in chairs with straight backs or low-back support. Keep your knees lower than your hips, with your feet flat on the floor.  When driving, sit up straight. Adjust the seat forward so you are not leaning toward the steering wheel.  A small pillow or rolled towel behind your lower back may help if you are driving long distances.   Standing  When standing for long periods, shift most of your weight to one leg at a time. Alternate legs every few minutes.   Sleeping  The best way to sleep is on your side with your knees bent. Put a low  pillow under your head to support your neck in a neutral spine position. Avoid thick pillows that bend your neck to one side. Put a pillow between your legs to further relax your lower back. If you sleep on your back, put pillows under your knees to support your legs in a slightly flexed position. Use a firm mattress. If your mattress sags, replace it, or use a 1/2-inch plywood board under the mattress to add support.  Follow-up care  Follow up with your healthcare provider, or as advised.  If X-rays, a CT scan or an MRI scan were taken, they will be reviewed by a radiologist. You will be notified of any new findings that may affect your care.  Call 911  Seek emergency medical care if any of the following occur:  Trouble breathing  Confusion  Very drowsy  Fainting or loss of consciousness  Rapid or very slow heart rate  Loss of  bowel or bladder control  When to seek medical care  Call your healthcare provider if any of the following occur:  Pain becomes worse or spreads to your arms or legs  Weakness or numbness in one or both arms or legs  Numbness in the groin area  Date Last Reviewed: 6/1/2016  © 1868-5346 [employer]. [address] [place] [address], [place] [address]. All rights reserved. This information is not intended as a substitute for professional medical care. Always follow your healthcare professional's instructions.

## 2024-01-13 ENCOUNTER — HOSPITAL ENCOUNTER (EMERGENCY)
Facility: HOSPITAL | Age: 59
Discharge: HOME | End: 2024-01-13
Attending: SURGERY
Payer: COMMERCIAL

## 2024-01-13 VITALS
HEART RATE: 67 BPM | OXYGEN SATURATION: 96 % | HEIGHT: 62 IN | WEIGHT: 220.56 LBS | SYSTOLIC BLOOD PRESSURE: 153 MMHG | RESPIRATION RATE: 17 BRPM | BODY MASS INDEX: 40.59 KG/M2 | DIASTOLIC BLOOD PRESSURE: 70 MMHG | TEMPERATURE: 98 F

## 2024-01-13 DIAGNOSIS — M54.32 SCIATICA OF LEFT SIDE: Primary | ICD-10-CM

## 2024-01-13 LAB
ALBUMIN SERPL BCP-MCNC: 3.7 G/DL (ref 3.5–5.2)
ALP SERPL-CCNC: 73 U/L (ref 55–135)
ALT SERPL W/O P-5'-P-CCNC: 29 U/L (ref 10–44)
ANION GAP SERPL CALC-SCNC: 8 MMOL/L (ref 8–16)
AST SERPL-CCNC: 17 U/L (ref 10–40)
BASOPHILS # BLD AUTO: 0.06 K/UL (ref 0–0.2)
BASOPHILS NFR BLD: 0.7 % (ref 0–1.9)
BILIRUB SERPL-MCNC: 0.3 MG/DL (ref 0.1–1)
BILIRUB UR QL STRIP: NEGATIVE
BUN SERPL-MCNC: 17 MG/DL (ref 6–20)
CALCIUM SERPL-MCNC: 9.6 MG/DL (ref 8.7–10.5)
CHLORIDE SERPL-SCNC: 109 MMOL/L (ref 95–110)
CLARITY UR: CLEAR
CO2 SERPL-SCNC: 26 MMOL/L (ref 23–29)
COLOR UR: YELLOW
CREAT SERPL-MCNC: 0.9 MG/DL (ref 0.5–1.4)
DIFFERENTIAL METHOD BLD: NORMAL
EOSINOPHIL # BLD AUTO: 0.4 K/UL (ref 0–0.5)
EOSINOPHIL NFR BLD: 4.3 % (ref 0–8)
ERYTHROCYTE [DISTWIDTH] IN BLOOD BY AUTOMATED COUNT: 13.7 % (ref 11.5–14.5)
EST. GFR  (NO RACE VARIABLE): >60 ML/MIN/1.73 M^2
GLUCOSE SERPL-MCNC: 90 MG/DL (ref 70–110)
GLUCOSE UR QL STRIP: NEGATIVE
HCT VFR BLD AUTO: 40.8 % (ref 37–48.5)
HGB BLD-MCNC: 13.5 G/DL (ref 12–16)
HGB UR QL STRIP: NEGATIVE
IMM GRANULOCYTES # BLD AUTO: 0.03 K/UL (ref 0–0.04)
IMM GRANULOCYTES NFR BLD AUTO: 0.4 % (ref 0–0.5)
KETONES UR QL STRIP: NEGATIVE
LEUKOCYTE ESTERASE UR QL STRIP: NEGATIVE
LIPASE SERPL-CCNC: 13 U/L (ref 4–60)
LYMPHOCYTES # BLD AUTO: 3.4 K/UL (ref 1–4.8)
LYMPHOCYTES NFR BLD: 39.7 % (ref 18–48)
MCH RBC QN AUTO: 28.7 PG (ref 27–31)
MCHC RBC AUTO-ENTMCNC: 33.1 G/DL (ref 32–36)
MCV RBC AUTO: 87 FL (ref 82–98)
MONOCYTES # BLD AUTO: 0.6 K/UL (ref 0.3–1)
MONOCYTES NFR BLD: 6.9 % (ref 4–15)
NEUTROPHILS # BLD AUTO: 4.1 K/UL (ref 1.8–7.7)
NEUTROPHILS NFR BLD: 48 % (ref 38–73)
NITRITE UR QL STRIP: NEGATIVE
NRBC BLD-RTO: 0 /100 WBC
PH UR STRIP: 6 [PH] (ref 5–8)
PLATELET # BLD AUTO: 260 K/UL (ref 150–450)
PMV BLD AUTO: 9.8 FL (ref 9.2–12.9)
POTASSIUM SERPL-SCNC: 3.7 MMOL/L (ref 3.5–5.1)
PROT SERPL-MCNC: 6.8 G/DL (ref 6–8.4)
PROT UR QL STRIP: NEGATIVE
RBC # BLD AUTO: 4.71 M/UL (ref 4–5.4)
SODIUM SERPL-SCNC: 143 MMOL/L (ref 136–145)
SP GR UR STRIP: >=1.03 (ref 1–1.03)
URN SPEC COLLECT METH UR: ABNORMAL
UROBILINOGEN UR STRIP-ACNC: NEGATIVE EU/DL
WBC # BLD AUTO: 8.51 K/UL (ref 3.9–12.7)

## 2024-01-13 PROCEDURE — 99284 EMERGENCY DEPT VISIT MOD MDM: CPT

## 2024-01-13 PROCEDURE — 96372 THER/PROPH/DIAG INJ SC/IM: CPT | Performed by: SURGERY

## 2024-01-13 PROCEDURE — 85025 COMPLETE CBC W/AUTO DIFF WBC: CPT | Performed by: SURGERY

## 2024-01-13 PROCEDURE — 83690 ASSAY OF LIPASE: CPT | Performed by: SURGERY

## 2024-01-13 PROCEDURE — 36415 COLL VENOUS BLD VENIPUNCTURE: CPT | Performed by: SURGERY

## 2024-01-13 PROCEDURE — 63600175 PHARM REV CODE 636 W HCPCS: Performed by: SURGERY

## 2024-01-13 PROCEDURE — 81003 URINALYSIS AUTO W/O SCOPE: CPT | Performed by: SURGERY

## 2024-01-13 PROCEDURE — 80053 COMPREHEN METABOLIC PANEL: CPT | Performed by: SURGERY

## 2024-01-13 RX ORDER — ONDANSETRON HYDROCHLORIDE 2 MG/ML
4 INJECTION, SOLUTION INTRAVENOUS
Status: COMPLETED | OUTPATIENT
Start: 2024-01-13 | End: 2024-01-13

## 2024-01-13 RX ORDER — HYDROCODONE BITARTRATE AND ACETAMINOPHEN 5; 325 MG/1; MG/1
1 TABLET ORAL EVERY 4 HOURS PRN
Qty: 15 TABLET | Refills: 0 | Status: SHIPPED | OUTPATIENT
Start: 2024-01-13 | End: 2024-01-16

## 2024-01-13 RX ORDER — GABAPENTIN 100 MG/1
100 CAPSULE ORAL 3 TIMES DAILY
Qty: 90 CAPSULE | Refills: 11 | Status: SHIPPED | OUTPATIENT
Start: 2024-01-13 | End: 2024-02-19

## 2024-01-13 RX ORDER — CYCLOBENZAPRINE HCL 10 MG
10 TABLET ORAL 3 TIMES DAILY PRN
Qty: 10 TABLET | Refills: 0 | Status: SHIPPED | OUTPATIENT
Start: 2024-01-13 | End: 2024-01-18

## 2024-01-13 RX ORDER — PREDNISONE 20 MG/1
40 TABLET ORAL DAILY
Qty: 10 TABLET | Refills: 0 | Status: SHIPPED | OUTPATIENT
Start: 2024-01-13 | End: 2024-01-18

## 2024-01-13 RX ORDER — MEPERIDINE HYDROCHLORIDE 50 MG/ML
50 INJECTION INTRAMUSCULAR; INTRAVENOUS; SUBCUTANEOUS
Status: COMPLETED | OUTPATIENT
Start: 2024-01-13 | End: 2024-01-13

## 2024-01-13 RX ADMIN — ONDANSETRON 4 MG: 2 INJECTION INTRAMUSCULAR; INTRAVENOUS at 09:01

## 2024-01-13 RX ADMIN — MEPERIDINE HYDROCHLORIDE 50 MG: 50 INJECTION INTRAMUSCULAR; INTRAVENOUS; SUBCUTANEOUS at 09:01

## 2024-01-14 NOTE — ED TRIAGE NOTES
58 y.o. female presents to ER ED 06/ED 06   Chief Complaint   Patient presents with    Flank Pain     PATIENT TO ER WITH C/O LEFT FLANK PAIN THAT RADIATES TO HER LEFT LOWER ABDOMEN AND PATIENT STATES IT IS NUMB IN THAT AREA

## 2024-01-14 NOTE — ED PROVIDER NOTES
Encounter Date: 1/13/2024       History     Chief Complaint   Patient presents with    Flank Pain     PATIENT TO ER WITH C/O LEFT FLANK PAIN THAT RADIATES TO HER LEFT LOWER ABDOMEN AND PATIENT STATES IT IS NUMB IN THAT AREA     Chiquis Man is a 58 y.o. female that presents with left flank pain  Patient has been having left flank pain on again off again for several months  20 years ago, patient had lumbar surgery for left-sided sciatica at [place]  Patient has no signs of cauda equina syndrome or leg weakness on evaluation  Patient has no bladder or bowel incontinence with no saddle anesthesia now  Patient states the pain is chronic, denies any dysuria or hematuria on history      The history is provided by the patient.     Review of patient's allergies indicates:   Allergen Reactions    Codeine Other (See Comments)     Chest pain    Depo-provera [medroxyprogesterone]      Past Medical History:   Diagnosis Date    Achilles tendinitis     Achilles tendinitis     Depression     Hyperlipidemia     Insomnia     Sleep apnea      Past Surgical History:   Procedure Laterality Date    BACK SURGERY      BLADDER SUSPENSION      BREAST BIOPSY Bilateral     multiple benign biopsies    CALCANEAL OSTEOTOMY  12/01/2022    Procedure: OSTEOTOMY, CALCANEUS;  Surgeon: Diaz Jordan MD;  Location: [facility];  Service: Orthopedics;;    COLONOSCOPY N/A 09/06/2019    Procedure: COLONOSCOPY;  Surgeon: Luis Bogran-Reyes, MD;  Location: [facility];  Service: Endoscopy;  Laterality: N/A;    DEBRIDEMENT OF TENDON Left 06/19/2020    Procedure: DEBRIDEMENT, TENDON;  Surgeon: Diaz Jordan MD;  Location: [facility] OR;  Service: Orthopedics;  Laterality: Left;  left achilles tendon    DEBRIDEMENT OF TENDON Right 12/01/2022    Procedure: DEBRIDEMENT, TENDON;  Surgeon: Diaz Jordan MD;  Location: [facility];  Service: Orthopedics;  Laterality: Right;    HYSTERECTOMY  12/2010    KNEE ARTHROSCOPY W/ MENISCAL REPAIR Left 2010    REPAIR OF ACHILLES  TENDON  2022    Procedure: REPAIR, TENDON, ACHILLES;  Surgeon: Diaz Jordan MD;  Location: [facility] OR;  Service: Orthopedics;;    TOTAL REDUCTION MAMMOPLASTY Bilateral      Family History   Problem Relation Age of Onset    Hyperlipidemia Mother     Hypertension Mother     Heart disease Father     Diabetes Father     Hypertension Father     Hyperlipidemia Father     Heart failure Father     Heart attack Father     Hypertension Sister     Hyperlipidemia Sister     Ovarian cancer Sister     Breast cancer Sister 66    Anxiety disorder Daughter     Depression Daughter     Brain cancer Maternal Uncle     Pancreatic cancer Maternal Uncle     Stroke Maternal Uncle     Breast cancer Paternal Aunt     Stroke Paternal Aunt     Heart disease Paternal Aunt     No Known Problems Maternal Grandmother     Diabetes Maternal Grandfather     Heart disease Maternal Grandfather     Diabetes Paternal Grandmother     Heart disease Paternal Grandmother     No Known Problems Brother     No Known Problems Son     No Known Problems Son      Social History     Tobacco Use    Smoking status: Former     Current packs/day: 0.00     Average packs/day: 0.5 packs/day for 43.4 years (21.7 ttl pk-yrs)     Types: Cigarettes     Start date: 3/18/1979     Quit date: 2022     Years since quittin.4    Smokeless tobacco: Never   Substance Use Topics    Alcohol use: Yes     Comment: occasionally    Drug use: Not Currently     Review of Systems   Constitutional: Negative.    HENT: Negative.     Eyes: Negative.    Respiratory: Negative.     Cardiovascular: Negative.    Gastrointestinal: Negative.    Genitourinary:  Positive for flank pain.   Musculoskeletal:  Positive for back pain.   Skin: Negative.    Neurological: Negative.    Psychiatric/Behavioral: Negative.         Physical Exam     Initial Vitals [24]   BP Pulse Resp Temp SpO2   (!) 192/88 77 18 98.3 °F (36.8 °C) 97 %      MAP       --         Physical Exam    Nursing note  and vitals reviewed.  Constitutional: Vital signs are normal. She appears well-developed and well-nourished. She is cooperative.   HENT:   Head: Normocephalic and atraumatic.   Eyes: Conjunctivae, EOM and lids are normal. Pupils are equal, round, and reactive to light.   Neck: Trachea normal and phonation normal. Neck supple. No JVD present.   Normal range of motion.   Full passive range of motion without pain.     Cardiovascular:  Normal rate, regular rhythm, S1 normal, S2 normal, normal heart sounds, intact distal pulses and normal pulses.           Pulmonary/Chest: Effort normal and breath sounds normal.   Abdominal: Abdomen is soft and flat. Bowel sounds are normal.   Musculoskeletal:      Cervical back: Full passive range of motion without pain, normal range of motion and neck supple.      Comments: (+) reproducible back pain in the left flank/L3-L4 area     Neurological: She is alert and oriented to person, place, and time. She has normal strength.   Skin: Skin is warm, dry and intact. Capillary refill takes less than 2 seconds.         ED Course   Procedures  Labs Reviewed   URINALYSIS, REFLEX TO URINE CULTURE - Abnormal; Notable for the following components:       Result Value    Specific Gravity, UA >=1.030 (*)     All other components within normal limits    Narrative:     Specimen Source->Urine   COMPREHENSIVE METABOLIC PANEL   CBC W/ AUTO DIFFERENTIAL   LIPASE          Imaging Results    None          Medications   ondansetron injection 4 mg (4 mg Intramuscular Given 1/13/24 2108)   meperidine injection 50 mg (50 mg Intramuscular Given 1/13/24 2108)     Medical Decision Making  58-year-old female presents with left-sided flank pain for several months  The pain has been worse in last week with no new trauma or fall/injury  No hematuria dysuria with no abdominal pain on clinical evaluation  Differential: kidney stone, pyelonephritis, musculoskeletal pain, sciatica    Problems Addressed:  Sciatica of left  side: complicated acute illness or injury    Amount and/or Complexity of Data Reviewed  Labs: ordered. Decision-making details documented in ED Course.    ED Management & Risks of Complication, Morbidity, & Mortality:  Lab work within normal limits, urinalysis is (-) tonight  Pain control in the emergency room, IM injections tonight  Sciatica symptoms on clinical evaluation this evening  No leg weakness or complicating factors noted now  Norco, gabapentin, Flexeril & steroid Rx on discharge  Ambulatory referral to the Pain Clinic on discharge today    Need for Hospitalization or Surgery with Social Determinants of Health:  This patient does not need to be hospitalized on ER evaluation today  The patient's diagnosis is not limited by social determinants of health  Does not require surgery or procedure (major or minor), no risk factors    Clinical Impression:  Final diagnoses:  [M54.32] Sciatica of left side (Primary)          ED Disposition Condition    Discharge Stable          ED Prescriptions       Medication Sig Dispense Start Date End Date Auth. Provider    HYDROcodone-acetaminophen (NORCO) 5-325 mg per tablet Take 1 tablet by mouth every 4 (four) hours as needed for Pain. 15 tablet 1/13/2024 1/16/2024 Kenroy Wall MD    cyclobenzaprine (FLEXERIL) 10 MG tablet Take 1 tablet (10 mg total) by mouth 3 (three) times daily as needed for Muscle spasms. 10 tablet 1/13/2024 1/18/2024 Kenroy Wall MD    predniSONE (DELTASONE) 20 MG tablet Take 2 tablets (40 mg total) by mouth once daily. for 5 days 10 tablet 1/13/2024 1/18/2024 Kenroy Wall MD    gabapentin (NEURONTIN) 100 MG capsule Take 1 capsule (100 mg total) by mouth 3 (three) times daily. 90 capsule 1/13/2024 1/12/2025 Kenroy Wall MD          Follow-up Information       Follow up With Specialties Details Why Contact Info    William Walton II NP Emergency Medicine Schedule an appointment as soon as possible for a visit in 2 days  1989  [facility] [address]  [phone]               Kenroy Wall MD  01/13/24 2138

## 2024-03-28 PROBLEM — E66.813 CLASS 3 SEVERE OBESITY DUE TO EXCESS CALORIES WITH SERIOUS COMORBIDITY AND BODY MASS INDEX (BMI) OF 40.0 TO 44.9 IN ADULT: Status: ACTIVE | Noted: 2024-03-28

## 2024-03-28 PROBLEM — Z91.89 MULTIPLE RISK FACTORS FOR CORONARY ARTERY DISEASE: Status: ACTIVE | Noted: 2024-03-28

## 2024-03-28 PROBLEM — G25.81 RLS (RESTLESS LEGS SYNDROME): Status: ACTIVE | Noted: 2024-03-28

## 2024-03-28 PROBLEM — R94.31 ABNORMAL EKG: Status: ACTIVE | Noted: 2024-03-28

## 2024-03-28 PROBLEM — F43.21 GRIEF: Status: ACTIVE | Noted: 2024-03-28

## 2024-03-28 PROBLEM — I20.89 EQUIVALENT ANGINA: Status: ACTIVE | Noted: 2024-03-28

## 2024-03-28 PROBLEM — I10 PRIMARY HYPERTENSION: Status: ACTIVE | Noted: 2024-03-28

## 2024-03-28 PROBLEM — Z87.891 FORMER SMOKER: Status: ACTIVE | Noted: 2024-03-28

## 2024-03-28 PROBLEM — G47.33 OSA (OBSTRUCTIVE SLEEP APNEA): Status: ACTIVE | Noted: 2024-03-28

## 2024-03-28 PROBLEM — Z91.199 NONCOMPLIANCE WITH CPAP TREATMENT: Status: ACTIVE | Noted: 2024-03-28

## 2024-03-28 PROBLEM — Z71.89 CPAP USE COUNSELING: Status: ACTIVE | Noted: 2024-03-28

## 2024-03-28 PROBLEM — Z12.11 COLON CANCER SCREENING: Chronic | Status: ACTIVE | Noted: 2019-09-06

## 2024-03-28 PROBLEM — G25.81 RLS (RESTLESS LEGS SYNDROME): Status: RESOLVED | Noted: 2024-03-28 | Resolved: 2024-03-28

## 2024-03-28 PROBLEM — R06.09 DYSPNEA ON EXERTION: Status: ACTIVE | Noted: 2024-03-28

## 2024-03-28 PROBLEM — R03.0 ELEVATED BLOOD PRESSURE READING: Status: RESOLVED | Noted: 2019-12-02 | Resolved: 2024-03-28

## 2024-03-28 PROBLEM — G25.81 RESTLESS LEG SYNDROME: Status: ACTIVE | Noted: 2024-03-28

## 2024-03-28 PROBLEM — E66.9 OBESITY, CLASS II, BMI 35-39.9: Status: RESOLVED | Noted: 2019-05-29 | Resolved: 2024-03-28

## 2024-03-28 PROBLEM — Z82.49 FAMILY HISTORY OF CARDIOVASCULAR DISEASE: Status: ACTIVE | Noted: 2024-03-28

## 2024-03-28 PROBLEM — M17.0 BILATERAL PRIMARY OSTEOARTHRITIS OF KNEE: Status: ACTIVE | Noted: 2024-03-28

## 2024-03-28 PROBLEM — E66.812 OBESITY, CLASS II, BMI 35-39.9: Status: RESOLVED | Noted: 2019-05-29 | Resolved: 2024-03-28

## 2024-03-28 PROBLEM — Z01.818 PRE-OP EVALUATION: Status: ACTIVE | Noted: 2024-03-28

## 2024-03-28 PROBLEM — Z72.0 TOBACCO ABUSE: Status: RESOLVED | Noted: 2019-05-29 | Resolved: 2024-03-28

## 2024-03-28 PROBLEM — E66.01 CLASS 3 SEVERE OBESITY DUE TO EXCESS CALORIES WITH SERIOUS COMORBIDITY AND BODY MASS INDEX (BMI) OF 40.0 TO 44.9 IN ADULT: Status: ACTIVE | Noted: 2024-03-28

## 2024-03-28 PROBLEM — R00.2 PALPITATIONS: Status: ACTIVE | Noted: 2024-03-28

## 2024-03-28 PROBLEM — Z87.891 HISTORY OF TOBACCO ABUSE: Status: ACTIVE | Noted: 2024-03-28

## 2024-03-28 PROBLEM — Z87.891 FORMER SMOKER: Status: RESOLVED | Noted: 2024-03-28 | Resolved: 2024-03-28

## 2024-05-15 ENCOUNTER — ON-DEMAND VIRTUAL (OUTPATIENT)
Dept: URGENT CARE | Facility: CLINIC | Age: 59
End: 2024-05-15
Payer: COMMERCIAL

## 2024-05-15 DIAGNOSIS — R30.0 DYSURIA: Primary | ICD-10-CM

## 2024-05-15 PROCEDURE — 99213 OFFICE O/P EST LOW 20 MIN: CPT | Mod: 95,,, | Performed by: NURSE PRACTITIONER

## 2024-05-15 RX ORDER — NITROFURANTOIN 25; 75 MG/1; MG/1
100 CAPSULE ORAL EVERY 12 HOURS
Qty: 14 CAPSULE | Refills: 0 | Status: SHIPPED | OUTPATIENT
Start: 2024-05-15 | End: 2024-05-23

## 2024-05-15 NOTE — PROGRESS NOTES
Subjective:      Patient ID: Chiquis Man is a 58 y.o. female.    Vitals:  vitals were not taken for this visit.     Chief Complaint: Urinary Tract Infection      Visit Type: TELE AUDIOVISUAL    Present with the patient at the time of consultation: TELEMED PRESENT WITH PATIENT: None, at work    Past Medical History:   Diagnosis Date    Achilles tendinitis     Achilles tendinitis     Depression     Hyperlipidemia     Insomnia     Sleep apnea      Past Surgical History:   Procedure Laterality Date    BACK SURGERY      BLADDER SUSPENSION      BREAST BIOPSY Bilateral     multiple benign biopsies    CALCANEAL OSTEOTOMY  12/01/2022    Procedure: OSTEOTOMY, CALCANEUS;  Surgeon: Diaz Jordan MD;  Location: [facility];  Service: Orthopedics;;    COLONOSCOPY N/A 09/06/2019    Procedure: COLONOSCOPY;  Surgeon: Luis Bogran-Reyes, MD;  Location: [facility];  Service: Endoscopy;  Laterality: N/A;    DEBRIDEMENT OF TENDON Left 06/19/2020    Procedure: DEBRIDEMENT, TENDON;  Surgeon: Daiz Jordan MD;  [facility];  Service: Orthopedics;  Laterality: Left;  left achilles tendon    DEBRIDEMENT OF TENDON Right 12/01/2022    Procedure: DEBRIDEMENT, TENDON;  Surgeon: Diaz Jordan MD;  Location: [facility];  Service: Orthopedics;  Laterality: Right;    HYSTERECTOMY  12/2010    KNEE ARTHROSCOPY W/ MENISCAL REPAIR Left 2010    REPAIR OF ACHILLES TENDON  12/01/2022    Procedure: REPAIR, TENDON, ACHILLES;  Surgeon: Diaz Jordan MD;  [facility];  Service: Orthopedics;;    TOTAL REDUCTION MAMMOPLASTY Bilateral 1991     Review of patient's allergies indicates:   Allergen Reactions    Codeine Other (See Comments)     Chest pain    Depo-provera [medroxyprogesterone]      Current Outpatient Medications on File Prior to Visit   Medication Sig Dispense Refill    albuterol (PROVENTIL) 2.5 mg /3 mL (0.083 %) nebulizer solution Take 3 mLs (2.5 mg total) by nebulization every 6 (six) hours as needed for Wheezing or Shortness of  Breath. Rescue 1 Box 0    ARIPiprazole (ABILIFY) 5 MG Tab TAKE 1 TABLET BY MOUTH AT BEDTIME ONCE DAILY .      cetirizine (ZYRTEC) 10 MG tablet Take 1 tablet (10 mg total) by mouth once daily. 30 tablet 0    cloNIDine (CATAPRES) 0.1 MG tablet TAKE 1 TABLET BY MOUTH AT BEDTIME ONCE DAILY .      FLUoxetine 40 MG capsule Take 1 capsule (40 mg total) by mouth once daily. 30 capsule 11    fluticasone propionate (FLONASE) 50 mcg/actuation nasal spray 1 spray (50 mcg total) by Each Nostril route once daily. 16 g 1    HOME NEBULIZER PLUS SIDESTREAM Katharina use as directed      hydrOXYzine pamoate (VISTARIL) 25 MG Cap TAKE 1 CAPSULE BY MOUTH EVERY 8 HOURS AS NEEDED FOR ANXIETY 60 capsule 0    lisinopriL (PRINIVIL,ZESTRIL) 40 MG tablet Take 1 tablet (40 mg total) by mouth once daily. 90 tablet 3    rOPINIRole (REQUIP) 0.25 MG tablet TAKE 1 TO 2 TABLETS BY MOUTH IN THE EVENING 60 tablet 3    rosuvastatin (CRESTOR) 20 MG tablet Take 1 tablet (20 mg total) by mouth every evening. (Patient not taking: Reported on 4/24/2024) 90 tablet 3    tiZANidine (ZANAFLEX) 2 MG tablet Take 1 tablet (2 mg total) by mouth every 8 (eight) hours as needed (pain). 60 tablet 5    [DISCONTINUED] atorvastatin (LIPITOR) 40 MG tablet Take 40 mg by mouth every evening.      [DISCONTINUED] carvediloL (COREG) 3.125 MG tablet Take 3.125 mg by mouth 2 (two) times daily with meals. (Patient not taking: Reported on 4/24/2024)       Current Facility-Administered Medications on File Prior to Visit   Medication Dose Route Frequency Provider Last Rate Last Admin    0.9%  NaCl infusion   Intravenous Continuous Ryan Drew MD   Stopped at 04/24/24 1058    atropine injection 0.25 mg  0.25 mg Intravenous PRN Ryan Drew MD        DOBUTamine 50 mg in D5W 50 mL (1 mg/mL) cardiac stress test infusion  10 mcg/kg/min Intravenous Continuous Ryan Drew MD   Stopped at 04/24/24 1054     Family History   Problem Relation Name Age of Onset    Hyperlipidemia  Mother      Hypertension Mother      Heart disease Father      Diabetes Father      Hypertension Father      Hyperlipidemia Father      Heart failure Father      Heart attack Father      Hypertension Sister      Hyperlipidemia Sister      Ovarian cancer Sister      Breast cancer Sister  66    No Known Problems Brother      Anxiety disorder Daughter      Depression Daughter      No Known Problems Son      No Known Problems Son      Brain cancer Maternal Uncle Forrest     Pancreatic cancer Maternal Uncle Gary     Stroke Maternal Uncle Gary     Breast cancer Paternal Aunt Karen     Stroke Paternal Aunt Karen     Heart disease Paternal Aunt Neli     No Known Problems Maternal Grandmother      Diabetes Maternal Grandfather      Heart disease Maternal Grandfather      Diabetes Paternal Grandmother      Heart disease Paternal Grandmother         Medications Ordered                [facility] [place] - [address]   [address] [place] [address]    Telephone: [phone]   Fax: [phone]   Hours: Not open 24 hours                         E-Prescribed (1 of 1)              nitrofurantoin, macrocrystal-monohydrate, (MACROBID) 100 MG capsule    Sig: Take 1 capsule (100 mg total) by mouth every 12 (twelve) hours. for 7 days       Start: 5/15/24     Quantity: 14 capsule Refills: 0                           Ohs Peq Odvv Intake    5/15/2024  9:43 AM CDT - Filed by Patient   What is your current physical address in the event of a medical emergency? [address] [place] [id number]   Are you able to take your vital signs? No   Please attach any relevant images or files          UTI symptoms for 4 days. Reports frequency, urgency and dysuria. +cloudy urine. No hematuria. No f/c/n/v. No vaginal discharge. No severe back, flank or pelvic pain.       Urinary Tract Infection   Associated symptoms include frequency and urgency. Pertinent negatives include no chills, flank pain, hematuria, nausea or  vomiting.       Constitution: Negative for chills and fever.   Gastrointestinal:  Negative for abdominal pain, nausea and vomiting.   Genitourinary:  Positive for dysuria, frequency and urgency. Negative for flank pain, hematuria, vaginal discharge, vaginal odor and pelvic pain.   Musculoskeletal:  Negative for back pain.        Objective:   The physical exam was conducted virtually.  Physical Exam   Constitutional: She is oriented to person, place, and time. She does not appear ill. No distress.   HENT:   Head: Normocephalic and atraumatic.   Nose: Nose normal.   Eyes: Extraocular movement intact   Pulmonary/Chest: Effort normal.   Abdominal: Normal appearance.   Musculoskeletal: Normal range of motion.         General: Normal range of motion.   Neurological: no focal deficit. She is alert and oriented to person, place, and time.   Psychiatric: Her behavior is normal. Mood normal.   Vitals reviewed.      Assessment:     1. Dysuria        Plan:   Patient encouraged to monitor symptoms closely and instructed to follow-up for new or worsening symptoms. Further, in-person, evaluation may be necessary for continued treatment. Please follow up with your primary care doctor or specialist as needed. Verbally discussed plan. Patient confirms understanding and is in agreement with treatment and plan.     You must understand that you've received a Virtual Care evaluation only and that you may be released before all your medical problems are known or treated. You, the patient, will arrange for follow up care as instructed.      Dysuria  -     nitrofurantoin, macrocrystal-monohydrate, (MACROBID) 100 MG capsule; Take 1 capsule (100 mg total) by mouth every 12 (twelve) hours. for 7 days  Dispense: 14 capsule; Refill: 0

## 2024-05-23 ENCOUNTER — OFFICE VISIT (OUTPATIENT)
Dept: URGENT CARE | Facility: CLINIC | Age: 59
End: 2024-05-23
Payer: COMMERCIAL

## 2024-05-23 VITALS
BODY MASS INDEX: 40.48 KG/M2 | RESPIRATION RATE: 17 BRPM | DIASTOLIC BLOOD PRESSURE: 55 MMHG | HEART RATE: 88 BPM | WEIGHT: 220 LBS | HEIGHT: 62 IN | TEMPERATURE: 98 F | OXYGEN SATURATION: 98 % | SYSTOLIC BLOOD PRESSURE: 105 MMHG

## 2024-05-23 DIAGNOSIS — R52 BODY ACHES: ICD-10-CM

## 2024-05-23 DIAGNOSIS — R50.9 FEVER IN ADULT: Primary | ICD-10-CM

## 2024-05-23 DIAGNOSIS — J31.0 RHINITIS, UNSPECIFIED TYPE: ICD-10-CM

## 2024-05-23 DIAGNOSIS — J02.9 SORE THROAT: ICD-10-CM

## 2024-05-23 LAB
CTP QC/QA: YES
MOLECULAR STREP A: NEGATIVE
POC MOLECULAR INFLUENZA A AGN: NEGATIVE
POC MOLECULAR INFLUENZA B AGN: NEGATIVE
SARS-COV-2 AG RESP QL IA.RAPID: NEGATIVE

## 2024-05-23 PROCEDURE — 87651 STREP A DNA AMP PROBE: CPT | Mod: QW,S$GLB,, | Performed by: NURSE PRACTITIONER

## 2024-05-23 PROCEDURE — 87502 INFLUENZA DNA AMP PROBE: CPT | Mod: QW,S$GLB,, | Performed by: NURSE PRACTITIONER

## 2024-05-23 PROCEDURE — 87811 SARS-COV-2 COVID19 W/OPTIC: CPT | Mod: QW,S$GLB,, | Performed by: NURSE PRACTITIONER

## 2024-05-23 PROCEDURE — 99214 OFFICE O/P EST MOD 30 MIN: CPT | Mod: S$GLB,,, | Performed by: NURSE PRACTITIONER

## 2024-05-23 RX ORDER — IPRATROPIUM BROMIDE 42 UG/1
1 SPRAY, METERED NASAL 3 TIMES DAILY
Qty: 15 ML | Refills: 0 | Status: SHIPPED | OUTPATIENT
Start: 2024-05-23 | End: 2024-05-28

## 2024-05-23 NOTE — PROGRESS NOTES
"Subjective:      Patient ID: Chiquis Man is a 58 y.o. female.    Vitals:  height is 5' 2" (1.575 m) and weight is 99.8 kg (220 lb). Her oral temperature is 98.4 °F (36.9 °C). Her blood pressure is 105/55 (abnormal) and her pulse is 88. Her respiration is 17 and oxygen saturation is 98%.     Chief Complaint: Sore Throat    C/o nasal congestion, sore throat,  right ear pain body aches and temp of 101.3 last night. She is taking tylenol with mild relief.    Sore Throat   This is a new problem. The current episode started yesterday. The problem has been gradually worsening. The pain is worse on the right side. The maximum temperature recorded prior to her arrival was 101 - 101.9 F. The fever has been present for Less than 1 day. The pain is at a severity of 3/10. The pain is mild. Associated symptoms include congestion, coughing, drooling, ear pain, headaches and neck pain. Pertinent negatives include no abdominal pain, diarrhea, ear discharge, hoarse voice, plugged ear sensation, shortness of breath, stridor, swollen glands, trouble swallowing or vomiting. Exposure to: Denies. She has tried acetaminophen for the symptoms. The treatment provided mild relief.       HENT:  Positive for ear pain, drooling, congestion and sore throat. Negative for ear discharge and trouble swallowing.    Neck: Positive for neck pain.   Respiratory:  Positive for cough. Negative for shortness of breath and stridor.    Gastrointestinal:  Negative for abdominal pain, vomiting and diarrhea.   Neurological:  Positive for headaches.      Objective:     Physical Exam   Constitutional: She is oriented to person, place, and time. She appears well-developed. She is cooperative.  Non-toxic appearance. She appears ill. No distress.   HENT:   Head: Normocephalic and atraumatic.   Ears:   Right Ear: External ear and ear canal normal. A middle ear effusion is present.   Left Ear: External ear and ear canal normal. A middle ear effusion is " present.   Nose: Nose normal. No nasal deformity. No epistaxis. Right sinus exhibits no maxillary sinus tenderness and no frontal sinus tenderness. Left sinus exhibits no maxillary sinus tenderness and no frontal sinus tenderness.   Mouth/Throat: Uvula is midline and mucous membranes are normal. No trismus in the jaw. Normal dentition. No uvula swelling. Posterior oropharyngeal erythema present. No oropharyngeal exudate or posterior oropharyngeal edema.   Eyes: Conjunctivae and lids are normal. No scleral icterus.   Neck: Trachea normal and phonation normal. Neck supple. No edema present. No erythema present. No neck rigidity present.   Cardiovascular: Normal rate, regular rhythm, normal heart sounds and normal pulses.   Pulmonary/Chest: Effort normal and breath sounds normal. No respiratory distress. She has no decreased breath sounds. She has no rhonchi.   Abdominal: Normal appearance.   Musculoskeletal: Normal range of motion.         General: No deformity. Normal range of motion.   Lymphadenopathy:     She has cervical adenopathy.        Right cervical: Superficial cervical adenopathy present.   Neurological: She is alert and oriented to person, place, and time. She exhibits normal muscle tone. Coordination normal.   Skin: Skin is warm, dry, intact, not diaphoretic and not pale.   Psychiatric: Her speech is normal and behavior is normal. Judgment and thought content normal.   Nursing note and vitals reviewed.      Assessment:     1. Fever in adult    2. Sore throat    3. Body aches    4. Rhinitis, unspecified type        Plan:     Results for orders placed or performed in visit on 05/23/24   POCT Influenza A/B MOLECULAR   Result Value Ref Range    POC Molecular Influenza A Ag Negative Negative    POC Molecular Influenza B Ag Negative Negative     Acceptable Yes    POCT Strep A, Molecular   Result Value Ref Range    Molecular Strep A, POC Negative Negative     Acceptable Yes    SARS  Coronavirus 2 Antigen, POCT Manual Read   Result Value Ref Range    SARS Coronavirus 2 Antigen Negative Negative     Acceptable Yes         Fever in adult  -     POCT Strep A, Molecular  -     SARS Coronavirus 2 Antigen, POCT Manual Read    Sore throat  -     POCT Influenza A/B MOLECULAR  -     POCT Strep A, Molecular    Body aches  -     POCT Influenza A/B MOLECULAR  -     POCT Strep A, Molecular  -     SARS Coronavirus 2 Antigen, POCT Manual Read    Rhinitis, unspecified type  -     POCT Influenza A/B MOLECULAR  -     ipratropium (ATROVENT) 42 mcg (0.06 %) nasal spray; 1 spray by Each Nostril route 3 (three) times daily. for 5 days  Dispense: 15 mL; Refill: 0

## 2024-05-23 NOTE — LETTER
May 23, 2024      [facility]  [address] [facility] [address]  [place] [address]  Phone: [phone]  Fax: [phone]       Patient: Chiquis Man   YOB: 1965  Date of Visit: 05/23/2024    To Whom It May Concern:    Corby Man  was at [facility] on 05/23/2024. The patient may return to work/school on 5/25/2024 with no restrictions if symptoms have improved and no fever for 24 hrs. If you have any questions or concerns, or if I can be of further assistance, please do not hesitate to contact me.    Sincerely,     Veronica Ortiz NP

## 2024-05-25 PROBLEM — R07.89 OTHER CHEST PAIN: Status: ACTIVE | Noted: 2024-05-25

## 2024-05-25 PROBLEM — R07.9 CHEST PAIN, CARDIAC: Status: ACTIVE | Noted: 2024-05-25

## 2024-05-25 PROBLEM — E78.5 HYPERLIPIDEMIA: Status: ACTIVE | Noted: 2024-05-25

## 2024-05-26 ENCOUNTER — HOSPITAL ENCOUNTER (INPATIENT)
Facility: HOSPITAL | Age: 59
LOS: 1 days | Discharge: HOME | DRG: 287 | End: 2024-05-27
Attending: STUDENT IN AN ORGANIZED HEALTH CARE EDUCATION/TRAINING PROGRAM | Admitting: STUDENT IN AN ORGANIZED HEALTH CARE EDUCATION/TRAINING PROGRAM
Payer: COMMERCIAL

## 2024-05-26 DIAGNOSIS — I20.9 ACUTE ANGINA: ICD-10-CM

## 2024-05-26 DIAGNOSIS — I20.0 UNSTABLE ANGINA: ICD-10-CM

## 2024-05-26 DIAGNOSIS — I25.10 CAD (CORONARY ARTERY DISEASE): ICD-10-CM

## 2024-05-26 DIAGNOSIS — R07.9 CHEST PAIN: ICD-10-CM

## 2024-05-26 DIAGNOSIS — E78.5 HYPERLIPIDEMIA, UNSPECIFIED HYPERLIPIDEMIA TYPE: Primary | ICD-10-CM

## 2024-05-26 PROBLEM — L02.91 ABSCESS: Status: RESOLVED | Noted: 2020-09-08 | Resolved: 2024-05-26

## 2024-05-26 LAB
ABO + RH BLD: NORMAL
ALBUMIN SERPL BCP-MCNC: 3.3 G/DL (ref 3.5–5.2)
ALP SERPL-CCNC: 61 U/L (ref 55–135)
ALT SERPL W/O P-5'-P-CCNC: 30 U/L (ref 10–44)
ANION GAP SERPL CALC-SCNC: 8 MMOL/L (ref 8–16)
ANION GAP SERPL CALC-SCNC: 9 MMOL/L (ref 8–16)
ASCENDING AORTA: 3.2 CM
AST SERPL-CCNC: 23 U/L (ref 10–40)
AV INDEX (PROSTH): 0.79
AV MEAN GRADIENT: 3 MMHG
AV PEAK GRADIENT: 6 MMHG
AV VALVE AREA BY VELOCITY RATIO: 2.37 CM²
AV VALVE AREA: 2.45 CM²
AV VELOCITY RATIO: 0.76
BASOPHILS # BLD AUTO: 0.03 K/UL (ref 0–0.2)
BASOPHILS # BLD AUTO: 0.04 K/UL (ref 0–0.2)
BASOPHILS NFR BLD: 0.7 % (ref 0–1.9)
BASOPHILS NFR BLD: 0.7 % (ref 0–1.9)
BILIRUB SERPL-MCNC: 0.3 MG/DL (ref 0.1–1)
BLD GP AB SCN CELLS X3 SERPL QL: NORMAL
BSA FOR ECHO PROCEDURE: 2.09 M2
BUN SERPL-MCNC: 15 MG/DL (ref 6–20)
BUN SERPL-MCNC: 16 MG/DL (ref 6–20)
CALCIUM SERPL-MCNC: 8.4 MG/DL (ref 8.7–10.5)
CALCIUM SERPL-MCNC: 8.7 MG/DL (ref 8.7–10.5)
CHLORIDE SERPL-SCNC: 109 MMOL/L (ref 95–110)
CHLORIDE SERPL-SCNC: 110 MMOL/L (ref 95–110)
CO2 SERPL-SCNC: 22 MMOL/L (ref 23–29)
CO2 SERPL-SCNC: 24 MMOL/L (ref 23–29)
CREAT SERPL-MCNC: 0.8 MG/DL (ref 0.5–1.4)
CREAT SERPL-MCNC: 0.8 MG/DL (ref 0.5–1.4)
CV ECHO LV RWT: 0.45 CM
DIFFERENTIAL METHOD BLD: ABNORMAL
DIFFERENTIAL METHOD BLD: NORMAL
DOP CALC AO PEAK VEL: 1.22 M/S
DOP CALC AO VTI: 26.8 CM
DOP CALC LVOT AREA: 3.1 CM2
DOP CALC LVOT DIAMETER: 1.99 CM
DOP CALC LVOT PEAK VEL: 0.93 M/S
DOP CALC LVOT STROKE VOLUME: 65.59 CM3
DOP CALC MV VTI: 23.9 CM
DOP CALCLVOT PEAK VEL VTI: 21.1 CM
E WAVE DECELERATION TIME: 201.26 MSEC
E/A RATIO: 0.8
E/E' RATIO: 8.24 M/S
ECHO LV POSTERIOR WALL: 1.01 CM (ref 0.6–1.1)
EOSINOPHIL # BLD AUTO: 0.2 K/UL (ref 0–0.5)
EOSINOPHIL # BLD AUTO: 0.3 K/UL (ref 0–0.5)
EOSINOPHIL NFR BLD: 4 % (ref 0–8)
EOSINOPHIL NFR BLD: 4.2 % (ref 0–8)
ERYTHROCYTE [DISTWIDTH] IN BLOOD BY AUTOMATED COUNT: 14.2 % (ref 11.5–14.5)
ERYTHROCYTE [DISTWIDTH] IN BLOOD BY AUTOMATED COUNT: 14.4 % (ref 11.5–14.5)
EST. GFR  (NO RACE VARIABLE): >60 ML/MIN/1.73 M^2
EST. GFR  (NO RACE VARIABLE): >60 ML/MIN/1.73 M^2
FRACTIONAL SHORTENING: 32 % (ref 28–44)
GLUCOSE SERPL-MCNC: 106 MG/DL (ref 70–110)
GLUCOSE SERPL-MCNC: 124 MG/DL (ref 70–110)
HCT VFR BLD AUTO: 41 % (ref 37–48.5)
HCT VFR BLD AUTO: 41.3 % (ref 37–48.5)
HGB BLD-MCNC: 13.1 G/DL (ref 12–16)
HGB BLD-MCNC: 13.3 G/DL (ref 12–16)
IMM GRANULOCYTES # BLD AUTO: 0.01 K/UL (ref 0–0.04)
IMM GRANULOCYTES # BLD AUTO: 0.01 K/UL (ref 0–0.04)
IMM GRANULOCYTES NFR BLD AUTO: 0.2 % (ref 0–0.5)
IMM GRANULOCYTES NFR BLD AUTO: 0.2 % (ref 0–0.5)
INR PPP: 1 (ref 0.8–1.2)
INTERVENTRICULAR SEPTUM: 1.01 CM (ref 0.6–1.1)
IVC DIAMETER: 1.51 CM
IVRT: 97.05 MSEC
LA MAJOR: 5.01 CM
LA MINOR: 5.11 CM
LA WIDTH: 3.8 CM
LEFT ATRIUM SIZE: 3.17 CM
LEFT ATRIUM VOLUME INDEX: 26 ML/M2
LEFT ATRIUM VOLUME: 51.8 CM3
LEFT INTERNAL DIMENSION IN SYSTOLE: 3.02 CM (ref 2.1–4)
LEFT VENTRICLE DIASTOLIC VOLUME INDEX: 45.66 ML/M2
LEFT VENTRICLE DIASTOLIC VOLUME: 90.87 ML
LEFT VENTRICLE MASS INDEX: 77 G/M2
LEFT VENTRICLE SYSTOLIC VOLUME INDEX: 17.9 ML/M2
LEFT VENTRICLE SYSTOLIC VOLUME: 35.64 ML
LEFT VENTRICULAR INTERNAL DIMENSION IN DIASTOLE: 4.47 CM (ref 3.5–6)
LEFT VENTRICULAR MASS: 153.73 G
LV LATERAL E/E' RATIO: 7 M/S
LV SEPTAL E/E' RATIO: 10 M/S
LVOT MG: 1.74 MMHG
LVOT MV: 0.62 CM/S
LYMPHOCYTES # BLD AUTO: 2 K/UL (ref 1–4.8)
LYMPHOCYTES # BLD AUTO: 2.8 K/UL (ref 1–4.8)
LYMPHOCYTES NFR BLD: 43.3 % (ref 18–48)
LYMPHOCYTES NFR BLD: 45.8 % (ref 18–48)
MAGNESIUM SERPL-MCNC: 2 MG/DL (ref 1.6–2.6)
MCH RBC QN AUTO: 27.8 PG (ref 27–31)
MCH RBC QN AUTO: 28.1 PG (ref 27–31)
MCHC RBC AUTO-ENTMCNC: 31.7 G/DL (ref 32–36)
MCHC RBC AUTO-ENTMCNC: 32.4 G/DL (ref 32–36)
MCV RBC AUTO: 87 FL (ref 82–98)
MCV RBC AUTO: 88 FL (ref 82–98)
MONOCYTES # BLD AUTO: 0.4 K/UL (ref 0.3–1)
MONOCYTES # BLD AUTO: 0.5 K/UL (ref 0.3–1)
MONOCYTES NFR BLD: 8.8 % (ref 4–15)
MONOCYTES NFR BLD: 9.1 % (ref 4–15)
MV MEAN GRADIENT: 1 MMHG
MV PEAK A VEL: 0.87 M/S
MV PEAK E VEL: 0.7 M/S
MV PEAK GRADIENT: 3 MMHG
MV STENOSIS PRESSURE HALF TIME: 58.37 MS
MV VALVE AREA BY CONTINUITY EQUATION: 2.74 CM2
MV VALVE AREA P 1/2 METHOD: 3.77 CM2
NEUTROPHILS # BLD AUTO: 1.9 K/UL (ref 1.8–7.7)
NEUTROPHILS # BLD AUTO: 2.4 K/UL (ref 1.8–7.7)
NEUTROPHILS NFR BLD: 40.3 % (ref 38–73)
NEUTROPHILS NFR BLD: 42.7 % (ref 38–73)
NRBC BLD-RTO: 0 /100 WBC
NRBC BLD-RTO: 0 /100 WBC
PHOSPHATE SERPL-MCNC: 3 MG/DL (ref 2.7–4.5)
PLATELET # BLD AUTO: 199 K/UL (ref 150–450)
PLATELET # BLD AUTO: 203 K/UL (ref 150–450)
PMV BLD AUTO: 10.1 FL (ref 9.2–12.9)
PMV BLD AUTO: 10.3 FL (ref 9.2–12.9)
POTASSIUM SERPL-SCNC: 3.8 MMOL/L (ref 3.5–5.1)
POTASSIUM SERPL-SCNC: 3.8 MMOL/L (ref 3.5–5.1)
PROT SERPL-MCNC: 6.2 G/DL (ref 6–8.4)
PROTHROMBIN TIME: 10.5 SEC (ref 9–12.5)
PV PEAK GRADIENT: 2 MMHG
PV PEAK VELOCITY: 0.78 M/S
RA MAJOR: 4.57 CM
RA PRESSURE ESTIMATED: 3 MMHG
RA WIDTH: 2.7 CM
RBC # BLD AUTO: 4.72 M/UL (ref 4–5.4)
RBC # BLD AUTO: 4.74 M/UL (ref 4–5.4)
SINUS: 3.25 CM
SODIUM SERPL-SCNC: 141 MMOL/L (ref 136–145)
SODIUM SERPL-SCNC: 141 MMOL/L (ref 136–145)
SPECIMEN OUTDATE: NORMAL
STJ: 2.85 CM
TDI LATERAL: 0.1 M/S
TDI SEPTAL: 0.07 M/S
TDI: 0.09 M/S
TROPONIN I SERPL DL<=0.01 NG/ML-MCNC: <0.006 NG/ML (ref 0–0.03)
TROPONIN I SERPL DL<=0.01 NG/ML-MCNC: <0.006 NG/ML (ref 0–0.03)
WBC # BLD AUTO: 4.53 K/UL (ref 3.9–12.7)
WBC # BLD AUTO: 6 K/UL (ref 3.9–12.7)
Z-SCORE OF LEFT VENTRICULAR DIMENSION IN END DIASTOLE: -2.56
Z-SCORE OF LEFT VENTRICULAR DIMENSION IN END SYSTOLE: -1.27

## 2024-05-26 PROCEDURE — 85025 COMPLETE CBC W/AUTO DIFF WBC: CPT | Mod: 91 | Performed by: INTERNAL MEDICINE

## 2024-05-26 PROCEDURE — 93005 ELECTROCARDIOGRAM TRACING: CPT

## 2024-05-26 PROCEDURE — 25000003 PHARM REV CODE 250: Performed by: INTERNAL MEDICINE

## 2024-05-26 PROCEDURE — 36415 COLL VENOUS BLD VENIPUNCTURE: CPT | Performed by: STUDENT IN AN ORGANIZED HEALTH CARE EDUCATION/TRAINING PROGRAM

## 2024-05-26 PROCEDURE — 36415 COLL VENOUS BLD VENIPUNCTURE: CPT | Mod: XB | Performed by: INTERNAL MEDICINE

## 2024-05-26 PROCEDURE — 85610 PROTHROMBIN TIME: CPT | Performed by: STUDENT IN AN ORGANIZED HEALTH CARE EDUCATION/TRAINING PROGRAM

## 2024-05-26 PROCEDURE — 84100 ASSAY OF PHOSPHORUS: CPT | Performed by: STUDENT IN AN ORGANIZED HEALTH CARE EDUCATION/TRAINING PROGRAM

## 2024-05-26 PROCEDURE — 86901 BLOOD TYPING SEROLOGIC RH(D): CPT | Performed by: STUDENT IN AN ORGANIZED HEALTH CARE EDUCATION/TRAINING PROGRAM

## 2024-05-26 PROCEDURE — 84484 ASSAY OF TROPONIN QUANT: CPT | Mod: 91 | Performed by: HOSPITALIST

## 2024-05-26 PROCEDURE — 93010 ELECTROCARDIOGRAM REPORT: CPT | Mod: ,,, | Performed by: INTERNAL MEDICINE

## 2024-05-26 PROCEDURE — 63600175 PHARM REV CODE 636 W HCPCS: Performed by: STUDENT IN AN ORGANIZED HEALTH CARE EDUCATION/TRAINING PROGRAM

## 2024-05-26 PROCEDURE — 84484 ASSAY OF TROPONIN QUANT: CPT | Performed by: STUDENT IN AN ORGANIZED HEALTH CARE EDUCATION/TRAINING PROGRAM

## 2024-05-26 PROCEDURE — 63600175 PHARM REV CODE 636 W HCPCS: Performed by: HOSPITALIST

## 2024-05-26 PROCEDURE — 25000003 PHARM REV CODE 250: Performed by: HOSPITALIST

## 2024-05-26 PROCEDURE — 21400001 HC TELEMETRY ROOM

## 2024-05-26 PROCEDURE — 36415 COLL VENOUS BLD VENIPUNCTURE: CPT | Performed by: HOSPITALIST

## 2024-05-26 PROCEDURE — 85025 COMPLETE CBC W/AUTO DIFF WBC: CPT | Performed by: STUDENT IN AN ORGANIZED HEALTH CARE EDUCATION/TRAINING PROGRAM

## 2024-05-26 PROCEDURE — 83735 ASSAY OF MAGNESIUM: CPT | Performed by: STUDENT IN AN ORGANIZED HEALTH CARE EDUCATION/TRAINING PROGRAM

## 2024-05-26 PROCEDURE — 99291 CRITICAL CARE FIRST HOUR: CPT | Mod: 25,,, | Performed by: INTERNAL MEDICINE

## 2024-05-26 PROCEDURE — 80053 COMPREHEN METABOLIC PANEL: CPT | Performed by: STUDENT IN AN ORGANIZED HEALTH CARE EDUCATION/TRAINING PROGRAM

## 2024-05-26 PROCEDURE — 25000003 PHARM REV CODE 250: Performed by: STUDENT IN AN ORGANIZED HEALTH CARE EDUCATION/TRAINING PROGRAM

## 2024-05-26 PROCEDURE — 25000242 PHARM REV CODE 250 ALT 637 W/ HCPCS: Performed by: STUDENT IN AN ORGANIZED HEALTH CARE EDUCATION/TRAINING PROGRAM

## 2024-05-26 PROCEDURE — 80048 BASIC METABOLIC PNL TOTAL CA: CPT | Mod: XB | Performed by: INTERNAL MEDICINE

## 2024-05-26 PROCEDURE — 99900035 HC TECH TIME PER 15 MIN (STAT)

## 2024-05-26 PROCEDURE — 94761 N-INVAS EAR/PLS OXIMETRY MLT: CPT

## 2024-05-26 RX ORDER — SODIUM,POTASSIUM PHOSPHATES 280-250MG
2 POWDER IN PACKET (EA) ORAL
Status: DISCONTINUED | OUTPATIENT
Start: 2024-05-26 | End: 2024-05-26

## 2024-05-26 RX ORDER — FLUOXETINE HYDROCHLORIDE 20 MG/1
40 CAPSULE ORAL DAILY
Status: DISCONTINUED | OUTPATIENT
Start: 2024-05-26 | End: 2024-05-27 | Stop reason: HOSPADM

## 2024-05-26 RX ORDER — IBUPROFEN 200 MG
24 TABLET ORAL
Status: DISCONTINUED | OUTPATIENT
Start: 2024-05-26 | End: 2024-05-27 | Stop reason: HOSPADM

## 2024-05-26 RX ORDER — CLONIDINE HYDROCHLORIDE 0.1 MG/1
0.1 TABLET ORAL DAILY PRN
Status: DISCONTINUED | OUTPATIENT
Start: 2024-05-26 | End: 2024-05-27 | Stop reason: HOSPADM

## 2024-05-26 RX ORDER — IBUPROFEN 200 MG
16 TABLET ORAL
Status: DISCONTINUED | OUTPATIENT
Start: 2024-05-26 | End: 2024-05-27 | Stop reason: HOSPADM

## 2024-05-26 RX ORDER — GLUCAGON 1 MG
1 KIT INJECTION
Status: DISCONTINUED | OUTPATIENT
Start: 2024-05-26 | End: 2024-05-27 | Stop reason: HOSPADM

## 2024-05-26 RX ORDER — DIPHENHYDRAMINE HCL 25 MG
50 CAPSULE ORAL ONCE
Status: DISCONTINUED | OUTPATIENT
Start: 2024-05-26 | End: 2024-05-26

## 2024-05-26 RX ORDER — SIMETHICONE 80 MG
1 TABLET,CHEWABLE ORAL 4 TIMES DAILY PRN
Status: DISCONTINUED | OUTPATIENT
Start: 2024-05-26 | End: 2024-05-27 | Stop reason: HOSPADM

## 2024-05-26 RX ORDER — LANOLIN ALCOHOL/MO/W.PET/CERES
800 CREAM (GRAM) TOPICAL
Status: DISCONTINUED | OUTPATIENT
Start: 2024-05-26 | End: 2024-05-26

## 2024-05-26 RX ORDER — SODIUM CHLORIDE 9 MG/ML
INJECTION, SOLUTION INTRAVENOUS CONTINUOUS
Status: DISCONTINUED | OUTPATIENT
Start: 2024-05-26 | End: 2024-05-26

## 2024-05-26 RX ORDER — PANTOPRAZOLE SODIUM 40 MG/1
40 TABLET, DELAYED RELEASE ORAL 2 TIMES DAILY
Status: DISCONTINUED | OUTPATIENT
Start: 2024-05-26 | End: 2024-05-27 | Stop reason: HOSPADM

## 2024-05-26 RX ORDER — ONDANSETRON HYDROCHLORIDE 2 MG/ML
4 INJECTION, SOLUTION INTRAVENOUS EVERY 8 HOURS PRN
Status: DISCONTINUED | OUTPATIENT
Start: 2024-05-26 | End: 2024-05-27 | Stop reason: HOSPADM

## 2024-05-26 RX ORDER — ALUMINUM HYDROXIDE, MAGNESIUM HYDROXIDE, AND SIMETHICONE 1200; 120; 1200 MG/30ML; MG/30ML; MG/30ML
30 SUSPENSION ORAL 4 TIMES DAILY PRN
Status: DISCONTINUED | OUTPATIENT
Start: 2024-05-26 | End: 2024-05-27 | Stop reason: HOSPADM

## 2024-05-26 RX ORDER — DIPHENHYDRAMINE HCL 25 MG
50 CAPSULE ORAL ONCE
Status: COMPLETED | OUTPATIENT
Start: 2024-05-27 | End: 2024-05-27

## 2024-05-26 RX ORDER — SODIUM CHLORIDE 0.9 % (FLUSH) 0.9 %
10 SYRINGE (ML) INJECTION EVERY 12 HOURS PRN
Status: DISCONTINUED | OUTPATIENT
Start: 2024-05-26 | End: 2024-05-27 | Stop reason: HOSPADM

## 2024-05-26 RX ORDER — NITROGLYCERIN 0.4 MG/1
0.4 TABLET SUBLINGUAL EVERY 5 MIN PRN
Status: DISCONTINUED | OUTPATIENT
Start: 2024-05-26 | End: 2024-05-27 | Stop reason: HOSPADM

## 2024-05-26 RX ORDER — IPRATROPIUM BROMIDE AND ALBUTEROL SULFATE 2.5; .5 MG/3ML; MG/3ML
3 SOLUTION RESPIRATORY (INHALATION) EVERY 4 HOURS PRN
Status: DISCONTINUED | OUTPATIENT
Start: 2024-05-26 | End: 2024-05-27 | Stop reason: HOSPADM

## 2024-05-26 RX ORDER — ENOXAPARIN SODIUM 100 MG/ML
40 INJECTION SUBCUTANEOUS EVERY 12 HOURS
Status: DISCONTINUED | OUTPATIENT
Start: 2024-05-26 | End: 2024-05-27 | Stop reason: HOSPADM

## 2024-05-26 RX ORDER — ASPIRIN 81 MG/1
81 TABLET ORAL DAILY
Status: DISCONTINUED | OUTPATIENT
Start: 2024-05-26 | End: 2024-05-27 | Stop reason: HOSPADM

## 2024-05-26 RX ORDER — TALC
6 POWDER (GRAM) TOPICAL NIGHTLY PRN
Status: DISCONTINUED | OUTPATIENT
Start: 2024-05-26 | End: 2024-05-27 | Stop reason: HOSPADM

## 2024-05-26 RX ORDER — CLOPIDOGREL BISULFATE 300 MG/1
600 TABLET, FILM COATED ORAL ONCE
Status: COMPLETED | OUTPATIENT
Start: 2024-05-26 | End: 2024-05-26

## 2024-05-26 RX ORDER — CLOPIDOGREL BISULFATE 75 MG/1
75 TABLET ORAL DAILY
Status: DISCONTINUED | OUTPATIENT
Start: 2024-05-27 | End: 2024-05-27

## 2024-05-26 RX ORDER — ACETAMINOPHEN 325 MG/1
650 TABLET ORAL EVERY 8 HOURS PRN
Status: DISCONTINUED | OUTPATIENT
Start: 2024-05-26 | End: 2024-05-27 | Stop reason: HOSPADM

## 2024-05-26 RX ORDER — HYDROXYZINE PAMOATE 25 MG/1
25 CAPSULE ORAL EVERY 6 HOURS PRN
Status: DISCONTINUED | OUTPATIENT
Start: 2024-05-26 | End: 2024-05-27 | Stop reason: HOSPADM

## 2024-05-26 RX ORDER — ACETAMINOPHEN 325 MG/1
650 TABLET ORAL EVERY 4 HOURS PRN
Status: DISCONTINUED | OUTPATIENT
Start: 2024-05-26 | End: 2024-05-27 | Stop reason: HOSPADM

## 2024-05-26 RX ORDER — SODIUM CHLORIDE 9 MG/ML
INJECTION, SOLUTION INTRAVENOUS CONTINUOUS
Status: DISCONTINUED | OUTPATIENT
Start: 2024-05-27 | End: 2024-05-27 | Stop reason: HOSPADM

## 2024-05-26 RX ORDER — ROPINIROLE 0.25 MG/1
0.25 TABLET, FILM COATED ORAL NIGHTLY
Status: DISCONTINUED | OUTPATIENT
Start: 2024-05-26 | End: 2024-05-27 | Stop reason: HOSPADM

## 2024-05-26 RX ORDER — BISACODYL 10 MG/1
10 SUPPOSITORY RECTAL DAILY PRN
Status: DISCONTINUED | OUTPATIENT
Start: 2024-05-26 | End: 2024-05-27 | Stop reason: HOSPADM

## 2024-05-26 RX ORDER — NALOXONE HCL 0.4 MG/ML
0.02 VIAL (ML) INJECTION
Status: DISCONTINUED | OUTPATIENT
Start: 2024-05-26 | End: 2024-05-27 | Stop reason: HOSPADM

## 2024-05-26 RX ORDER — POLYETHYLENE GLYCOL 3350 17 G/17G
17 POWDER, FOR SOLUTION ORAL DAILY
Status: DISCONTINUED | OUTPATIENT
Start: 2024-05-26 | End: 2024-05-27 | Stop reason: HOSPADM

## 2024-05-26 RX ORDER — ATORVASTATIN CALCIUM 40 MG/1
80 TABLET, FILM COATED ORAL NIGHTLY
Status: DISCONTINUED | OUTPATIENT
Start: 2024-05-26 | End: 2024-05-27 | Stop reason: HOSPADM

## 2024-05-26 RX ORDER — PROCHLORPERAZINE EDISYLATE 5 MG/ML
5 INJECTION INTRAMUSCULAR; INTRAVENOUS EVERY 6 HOURS PRN
Status: DISCONTINUED | OUTPATIENT
Start: 2024-05-26 | End: 2024-05-27 | Stop reason: HOSPADM

## 2024-05-26 RX ORDER — ARIPIPRAZOLE 5 MG/1
5 TABLET ORAL DAILY
Status: DISCONTINUED | OUTPATIENT
Start: 2024-05-26 | End: 2024-05-27 | Stop reason: HOSPADM

## 2024-05-26 RX ADMIN — NITROGLYCERIN 0.4 MG: 0.4 TABLET, ORALLY DISINTEGRATING SUBLINGUAL at 10:05

## 2024-05-26 RX ADMIN — ENOXAPARIN SODIUM 40 MG: 40 INJECTION SUBCUTANEOUS at 09:05

## 2024-05-26 RX ADMIN — PANTOPRAZOLE SODIUM 40 MG: 40 TABLET, DELAYED RELEASE ORAL at 08:05

## 2024-05-26 RX ADMIN — ENOXAPARIN SODIUM 40 MG: 40 INJECTION SUBCUTANEOUS at 08:05

## 2024-05-26 RX ADMIN — CLOPIDOGREL BISULFATE 600 MG: 300 TABLET, FILM COATED ORAL at 11:05

## 2024-05-26 RX ADMIN — ALUMINUM HYDROXIDE, MAGNESIUM HYDROXIDE, AND SIMETHICONE 30 ML: 1200; 120; 1200 SUSPENSION ORAL at 09:05

## 2024-05-26 RX ADMIN — ATORVASTATIN CALCIUM 80 MG: 40 TABLET, FILM COATED ORAL at 08:05

## 2024-05-26 RX ADMIN — ARIPIPRAZOLE 5 MG: 5 TABLET ORAL at 09:05

## 2024-05-26 RX ADMIN — FLUOXETINE 40 MG: 20 CAPSULE ORAL at 09:05

## 2024-05-26 RX ADMIN — ROPINIROLE HYDROCHLORIDE 0.25 MG: 0.25 TABLET, FILM COATED ORAL at 09:05

## 2024-05-26 RX ADMIN — ALUMINUM HYDROXIDE, MAGNESIUM HYDROXIDE, AND SIMETHICONE 30 ML: 1200; 120; 1200 SUSPENSION ORAL at 06:05

## 2024-05-26 RX ADMIN — ASPIRIN 81 MG: 81 TABLET, COATED ORAL at 09:05

## 2024-05-26 NOTE — H&P
[facility] Medicine  History & Physical    Patient Name: Chiquis Man  MRN: [id number]  Patient Class: IP- Inpatient  Admission Date: 5/26/2024  Attending Physician: Juan Jose Schwarz MD   Primary Care Provider: William Walton II, NP         Patient information was obtained from patient and ER records.     Subjective:     Principal Problem:Chest pain    Chief Complaint: No chief complaint on file.       HPI: This is a 58-year-old female with a past medical history of hypertension, hyperlipidemia, DORA, depression, restless leg who was transferred from [facility] for evaluation of chest pain.    Patient presented to outside hospital ED on 05/25 with chest pain that started an hour and a half prior to presentation.  She developed sudden-onset substernal chest pain, with radiation to her jaw and left arm.  Associated symptoms included diaphoresis, shortness of breaths, and nausea.  She denies having similar episodes in the past or recurrence of her chest pain after receiving nitroglycerin.  Of note, the patient had a positive stress test on 4/24. While hospitalized, the patient remained hemodynamically stable.  Labs were largely unremarkable, including negative troponin (<0.006 > 0.011).  She was treated with aspirin 324 mg, a nitroglycerin 0.4 mg SL with improvement of her chest pain. Patient was transferred to [facility] for cardiology evaluation/potential PCI.     Past Medical History:   Diagnosis Date    Achilles tendinitis     Achilles tendinitis     Depression     Hyperlipidemia     Insomnia     Sleep apnea        Past Surgical History:   Procedure Laterality Date    BACK SURGERY      BLADDER SUSPENSION      BREAST BIOPSY Bilateral     multiple benign biopsies    CALCANEAL OSTEOTOMY  12/01/2022    Procedure: OSTEOTOMY, CALCANEUS;  Surgeon: Diaz Jordan MD;  Location: [facility];  Service: Orthopedics;;    COLONOSCOPY N/A 09/06/2019    Procedure: COLONOSCOPY;   Surgeon: Luis Bogran-Reyes, MD;  Location: [facility];  Service: Endoscopy;  Laterality: N/A;    DEBRIDEMENT OF TENDON Left 06/19/2020    Procedure: DEBRIDEMENT, TENDON;  Surgeon: Diaz Jordan MD;  Location: [facility] OR;  Service: Orthopedics;  Laterality: Left;  left achilles tendon    DEBRIDEMENT OF TENDON Right 12/01/2022    Procedure: DEBRIDEMENT, TENDON;  Surgeon: Diaz Jordan MD;  Location: [facility] OR;  Service: Orthopedics;  Laterality: Right;    HYSTERECTOMY  12/2010    KNEE ARTHROSCOPY W/ MENISCAL REPAIR Left 2010    REPAIR OF ACHILLES TENDON  12/01/2022    Procedure: REPAIR, TENDON, ACHILLES;  Surgeon: Diaz Jordan MD;  Location: [facility] OR;  Service: Orthopedics;;    TOTAL REDUCTION MAMMOPLASTY Bilateral 1991       Review of patient's allergies indicates:   Allergen Reactions    Codeine Other (See Comments)     Chest pain    Depo-provera [medroxyprogesterone]        Current Facility-Administered Medications on File Prior to Encounter   Medication    [COMPLETED] aspirin chewable tablet 324 mg    [COMPLETED] nitroGLYCERIN SL tablet 0.4 mg    [DISCONTINUED] acetaminophen tablet 650 mg    [DISCONTINUED] atorvastatin tablet 80 mg    [DISCONTINUED] dextrose 50% injection 12.5 g    [DISCONTINUED] dextrose 50% injection 25 g    [DISCONTINUED] enoxaparin injection 40 mg    [DISCONTINUED] FLUoxetine capsule 40 mg    [DISCONTINUED] glucagon (human recombinant) injection 1 mg    [DISCONTINUED] glucose chewable tablet 16 g    [DISCONTINUED] glucose chewable tablet 24 g    [DISCONTINUED] hydrOXYzine pamoate capsule 25 mg    [DISCONTINUED] naloxone 0.4 mg/mL injection 0.02 mg    [DISCONTINUED] rOPINIRole tablet 0.25 mg    [DISCONTINUED] sodium chloride 0.9% flush 10 mL     Current Outpatient Medications on File Prior to Encounter   Medication Sig    albuterol (PROVENTIL) 2.5 mg /3 mL (0.083 %) nebulizer solution Take 3 mLs (2.5 mg total) by nebulization every 6 (six) hours as needed for Wheezing or Shortness of Breath.  Rescue    ARIPiprazole (ABILIFY) 5 MG Tab TAKE 1 TABLET BY MOUTH AT BEDTIME ONCE DAILY . (Patient not taking: Reported on 5/23/2024)    cetirizine (ZYRTEC) 10 MG tablet Take 1 tablet (10 mg total) by mouth once daily.    cloNIDine (CATAPRES) 0.1 MG tablet TAKE 1 TABLET BY MOUTH AT BEDTIME ONCE DAILY .    FLUoxetine 40 MG capsule Take 1 capsule (40 mg total) by mouth once daily.    fluticasone propionate (FLONASE) 50 mcg/actuation nasal spray 1 spray (50 mcg total) by Each Nostril route once daily.    HOME NEBULIZER PLUS SIDESTREAM Katharina use as directed    hydrOXYzine pamoate (VISTARIL) 25 MG Cap TAKE 1 CAPSULE BY MOUTH EVERY 8 HOURS AS NEEDED FOR ANXIETY    ipratropium (ATROVENT) 42 mcg (0.06 %) nasal spray 1 spray by Each Nostril route 3 (three) times daily. for 5 days    lisinopriL (PRINIVIL,ZESTRIL) 40 MG tablet Take 1 tablet (40 mg total) by mouth once daily.    rOPINIRole (REQUIP) 0.25 MG tablet TAKE 1 TO 2 TABLETS BY MOUTH IN THE EVENING    rosuvastatin (CRESTOR) 20 MG tablet Take 1 tablet (20 mg total) by mouth every evening.    tiZANidine (ZANAFLEX) 2 MG tablet Take 1 tablet (2 mg total) by mouth every 8 (eight) hours as needed (pain).     Family History       Problem Relation (Age of Onset)    Anxiety disorder Daughter    Brain cancer Maternal Uncle    Breast cancer Sister (66), Paternal Aunt    Depression Daughter    Diabetes Father, Maternal Grandfather, Paternal Grandmother    Heart attack Father    Heart disease Father, Paternal Aunt, Maternal Grandfather, Paternal Grandmother    Heart failure Father    Hyperlipidemia Mother, Father, Sister    Hypertension Mother, Father, Sister    No Known Problems Brother, Son, Son, Maternal Grandmother    Ovarian cancer Sister    Pancreatic cancer Maternal Uncle    Stroke Maternal Uncle, Paternal Aunt          Tobacco Use    Smoking status: Former     Current packs/day: 0.00     Average packs/day: 0.5 packs/day for 43.4 years (21.7 ttl pk-yrs)     Types: Cigarettes      Start date: 3/18/1979     Quit date: 2022     Years since quittin.8     Passive exposure: Past    Smokeless tobacco: Never   Substance and Sexual Activity    Alcohol use: Yes     Comment: occasionally    Drug use: Not Currently    Sexual activity: Yes     Partners: Male     Birth control/protection: See Surgical Hx     Review of Systems   Constitutional:  Positive for diaphoresis.   HENT: Negative.     Eyes: Negative.    Respiratory: Negative.     Cardiovascular:  Positive for chest pain.   Gastrointestinal:  Positive for nausea.   Endocrine: Negative.    Genitourinary: Negative.    Musculoskeletal: Negative.    Skin: Negative.    Allergic/Immunologic: Negative.    Neurological: Negative.    Psychiatric/Behavioral: Negative.       Objective:     Vital Signs (Most Recent):  Temp: 98.2 °F (36.8 °C) (24 0300)  Pulse: 73 (24 0330)  Resp: 14 (24 0330)  BP: (!) 147/76 (24 0300)  SpO2: 97 % (24 0330) Vital Signs (24h Range):  Temp:  [96.2 °F (35.7 °C)-98.2 °F (36.8 °C)] 98.2 °F (36.8 °C)  Pulse:  [65-95] 73  Resp:  [14-24] 14  SpO2:  [94 %-99 %] 97 %  BP: (139-175)/(61-86) 147/76     Weight: 99.9 kg (220 lb 3.8 oz)  Body mass index is 40.28 kg/m².     Physical Exam  Vitals and nursing note reviewed.   Constitutional:       General: She is not in acute distress.     Appearance: Normal appearance. She is obese. She is not ill-appearing.   HENT:      Head: Normocephalic and atraumatic.      Nose: Nose normal.      Mouth/Throat:      Mouth: Mucous membranes are moist.   Eyes:      Extraocular Movements: Extraocular movements intact.   Cardiovascular:      Rate and Rhythm: Normal rate.      Pulses: Normal pulses.      Heart sounds: No murmur heard.  Pulmonary:      Effort: Pulmonary effort is normal. No respiratory distress.   Abdominal:      General: Abdomen is flat.      Palpations: Abdomen is soft.      Tenderness: There is no abdominal tenderness.   Musculoskeletal:      Right lower  leg: No edema.      Left lower leg: No edema.   Skin:     General: Skin is warm.      Capillary Refill: Capillary refill takes less than 2 seconds.   Neurological:      General: No focal deficit present.      Mental Status: She is alert.   Psychiatric:         Mood and Affect: Mood normal.                Significant Labs: All pertinent labs within the past 24 hours have been reviewed.    Significant Imaging: I have reviewed all pertinent imaging results/findings within the past 24 hours.  Assessment/Plan:     * Chest pain  Concern for ACS   Continue to trend troponin  Cardiology consult  Telemetry monitoring   Nitroglycerin PRN      Hyperlipidemia  Continue statin       Class 3 severe obesity due to excess calories with serious comorbidity and body mass index (BMI) of 40.0 to 44.9 in adult  Body mass index is 40.28 kg/m². Morbid obesity complicates all aspects of disease management from diagnostic modalities to treatment. Weight loss encouraged and health benefits explained to patient.         Primary hypertension  Chronic, controlled. Latest blood pressure and vitals reviewed-     Temp:  [96.2 °F (35.7 °C)-98.2 °F (36.8 °C)]   Pulse:  [65-95]   Resp:  [14-24]   BP: (139-175)/(61-86)   SpO2:  [94 %-99 %] .   Home meds for hypertension were reviewed and noted below.   Hypertension Medications               cloNIDine (CATAPRES) 0.1 MG tablet TAKE 1 TABLET BY MOUTH AT BEDTIME ONCE DAILY .    lisinopriL (PRINIVIL,ZESTRIL) 40 MG tablet Take 1 tablet (40 mg total) by mouth once daily.            While in the hospital, will manage blood pressure as follows; Continue home antihypertensive regimen    Will utilize p.r.n. blood pressure medication only if patient's blood pressure greater than 180/110 and she develops symptoms such as worsening chest pain or shortness of breath.    Obstructive sleep apnea (adult) (pediatric)  Outpatient follow up with sleep medicine         VTE Risk Mitigation (From admission, onward)            Ordered     enoxaparin injection 40 mg  Every 12 hours         05/26/24 0308     IP VTE HIGH RISK PATIENT  Once         05/26/24 0308     Place sequential compression device  Until discontinued         05/26/24 0308                               Alek Allen MD  Department of Hospital Medicine  [facility] - Intensive Care

## 2024-05-26 NOTE — LETTER
May 27, 2024         Wali NOVAK  [facility]  [place] [address]  Phone: [phone]  Fax: [phone]       Patient: Chiquis Man   YOB: 1965  Date of Visit: 05/27/2024    To Whom It May Concern:    Corby Man  was hospitalized at [facility] from 05/26/2024 until 05/27/2024. The patient may return to work on Wednesday, 05/29/2024. Please excuse Ms. Man from her duties during her hospitalization and recovery.      Sincerely,    Karen Hicks RN

## 2024-05-26 NOTE — PROGRESS NOTES
Pt transferred via w/c to [facility]. Daughter @ side. No distress noted.

## 2024-05-26 NOTE — PROGRESS NOTES
Pt continues to c/o heartburn with no relief from  Maalox. Pt back to bed. Feeling tightness to chest and pain to jaw. Dr Toth notified. 12 lead EKG done. Troponin ordered. Nitro administered. O2 2L NC applied. Dr Herrera notified.

## 2024-05-26 NOTE — PROGRESS NOTES
[facility]  Nurses Note -- 4 Eyes      5/26/2024       Skin assessed on: Q Shift      [x] No Pressure Injuries Present    [x]Prevention Measures Documented    [] Yes LDA  for Pressure Injury Previously documented     [] Yes New Pressure Injury Discovered   [] LDA for New Pressure Injury Added      Attending RN:  Stephanie Esparza RN     Second RN:  MELISSA Cronin

## 2024-05-26 NOTE — PROGRESS NOTES
2 doses of nitro administered 5 minutes apart. Pt states the jaw pain and heartburn have subsided. Tightness to chest is the same.

## 2024-05-26 NOTE — PLAN OF CARE
Pt admitted as ICU overflow/tele border, transferred from [facility] for cardiology. VSS stable upon transfer.Safety precautions in place at all times.           Problem: Adult Inpatient Plan of Care  Goal: Plan of Care Review  Outcome: Progressing     Problem: Adult Inpatient Plan of Care  Goal: Optimal Comfort and Wellbeing  Outcome: Progressing     Problem: Adult Inpatient Plan of Care  Goal: Readiness for Transition of Care  Outcome: Progressing

## 2024-05-26 NOTE — CONSULTS
[facility] - Intensive Care  Cardiology  Consult Note    Patient Name: Chiquis Man  MRN: [id number]  Admission Date: 5/26/2024  Hospital Length of Stay: 0 days  Code Status: Full Code   Attending Provider: Swati Albert MD   Consulting Provider: Nikki Herrera MD  Primary Care Physician: William Walton II, NP  Principal Problem:Chest pain    Patient information was obtained from patient and ER records.     Inpatient consult to Cardiology  Consult performed by: Nikki Herrera MD  Consult ordered by: Swati Albert MD        Subjective:     Chief Complaint:  cp     HPI:     Patient is a pleasant 58-year-old lady.  Medical history includes morbid obesity among other medical issues.  Has been having chest pains on and off.  Had a stress echo done which was abnormal.  States an outpatient angiogram has not been scheduled yet.  But came in with substernal chest pressure, radiating to her jaws.  Also symptoms of heartburn.  In ICU.  Received 3 nitroglycerin today with relief of chest pain.  Denies orthopnea, PND        Left Ventricle: The left ventricle is normal in size. Ventricular mass is normal. Normal wall thickness. There is normal systolic function. Ejection fraction by visual approximation is 60%. Global longitudinal strain is -17.0%. There is normal diastolic function.    Right Ventricle: Normal right ventricular cavity size. Wall thickness is normal. Right ventricle wall motion  is normal. Systolic function is normal.    Aortic Valve: The aortic valve is a trileaflet valve.    Mitral Valve: There is no stenosis.    Pulmonic Valve: There is no stenosis.    IVC/SVC: Normal venous pressure at 3 mmHg.    Stress Protocol: The patient was infused intravenously with dobutamine. The patient received a graduated infusion of the stress agent beginning at 10.0 mcg/kg/min to a peak dose of 30.0 mcg/kg/min. The peak heart rate was 146 bpm, which is 94% of age predicted maximum heart rate. The patient  reported chest discomfort during the stress test. The test was stopped because the end of the protocol was reached.    Baseline ECG: The Baseline ECG reveals sinus rhythm.    Stress ECG: There is normal blood pressure response with stress.    Post-stress Echo: The post-stress echo showed worsened wall motion abnormalities compared to baseline which are indicative of myocardial ischemia.    Post-stress Impression: The study is consistent with ischemia.         Results for orders placed or performed in visit on 03/28/24   EKG 12-lead    Collection Time: 03/28/24 12:47 PM   Result Value Ref Range    QRS Duration 86 ms    OHS QTC Calculation 441 ms    Narrative    Test Reason : Z01.818,R06.09,    Vent. Rate : 074 BPM     Atrial Rate : 074 BPM     P-R Int : 132 ms          QRS Dur : 086 ms      QT Int : 398 ms       P-R-T Axes : 063 063 037 degrees     QTc Int : 441 ms    Normal sinus rhythm  Within normal limits  When compared with ECG of 21-DEC-2023 09:54,  Nonspecific T wave abnormality, improved in Inferior leads  Confirmed by David Cantor MD ([id number]) on 3/30/2024 9:29:10 AM    Referred By: SINTIA SANDOVAL II           Confirmed By:David Cantor MD       No results found for this or any previous visit.      No results found for this or any previous visit.      No results found for this or any previous visit.      HPI: This is a 58-year-old female with a past medical history of hypertension, hyperlipidemia, DORA, depression, restless leg who was transferred from [facility] for evaluation of chest pain.     Patient presented to outside hospital ED on 05/25 with chest pain that started an hour and a half prior to presentation.  She developed sudden-onset substernal chest pain, with radiation to her jaw and left arm.  Associated symptoms included diaphoresis, shortness of breaths, and nausea.  She denies having similar episodes in the past or recurrence of her chest pain after receiving nitroglycerin.  Of note,  the patient had a positive stress test on 4/24. While hospitalized, the patient remained hemodynamically stable.  Labs were largely unremarkable, including negative troponin (<0.006 > 0.011).  She was treated with aspirin 324 mg, a nitroglycerin 0.4 mg SL with improvement of her chest pain. Patient was transferred to [facility] for cardiology evaluation/potential PCI.        Past Medical History:   Diagnosis Date    Achilles tendinitis     Achilles tendinitis     Depression     Hyperlipidemia     Insomnia     Sleep apnea        Past Surgical History:   Procedure Laterality Date    BACK SURGERY      BLADDER SUSPENSION      BREAST BIOPSY Bilateral     multiple benign biopsies    CALCANEAL OSTEOTOMY  12/01/2022    Procedure: OSTEOTOMY, CALCANEUS;  Surgeon: Diaz Jordan MD;  Location: [facility] OR;  Service: Orthopedics;;    COLONOSCOPY N/A 09/06/2019    Procedure: COLONOSCOPY;  Surgeon: Luis Bogran-Reyes, MD;  Location: [facility];  Service: Endoscopy;  Laterality: N/A;    DEBRIDEMENT OF TENDON Left 06/19/2020    Procedure: DEBRIDEMENT, TENDON;  Surgeon: Diaz Jordan MD;  Location: [facility] OR;  Service: Orthopedics;  Laterality: Left;  left achilles tendon    DEBRIDEMENT OF TENDON Right 12/01/2022    Procedure: DEBRIDEMENT, TENDON;  Surgeon: Diaz Jordan MD;  Location: [facility];  Service: Orthopedics;  Laterality: Right;    HYSTERECTOMY  12/2010    KNEE ARTHROSCOPY W/ MENISCAL REPAIR Left 2010    REPAIR OF ACHILLES TENDON  12/01/2022    Procedure: REPAIR, TENDON, ACHILLES;  Surgeon: Diaz Jordan MD;  Location: [facility];  Service: Orthopedics;;    TOTAL REDUCTION MAMMOPLASTY Bilateral 1991       Review of patient's allergies indicates:   Allergen Reactions    Codeine Other (See Comments)     Chest pain    Depo-provera [medroxyprogesterone]        Current Facility-Administered Medications on File Prior to Encounter   Medication    [COMPLETED] aspirin chewable tablet 324 mg    [COMPLETED] nitroGLYCERIN SL tablet 0.4  mg    [DISCONTINUED] acetaminophen tablet 650 mg    [DISCONTINUED] atorvastatin tablet 80 mg    [DISCONTINUED] dextrose 50% injection 12.5 g    [DISCONTINUED] dextrose 50% injection 25 g    [DISCONTINUED] enoxaparin injection 40 mg    [DISCONTINUED] FLUoxetine capsule 40 mg    [DISCONTINUED] glucagon (human recombinant) injection 1 mg    [DISCONTINUED] glucose chewable tablet 16 g    [DISCONTINUED] glucose chewable tablet 24 g    [DISCONTINUED] hydrOXYzine pamoate capsule 25 mg    [DISCONTINUED] naloxone 0.4 mg/mL injection 0.02 mg    [DISCONTINUED] rOPINIRole tablet 0.25 mg    [DISCONTINUED] sodium chloride 0.9% flush 10 mL     Current Outpatient Medications on File Prior to Encounter   Medication Sig    albuterol (PROVENTIL) 2.5 mg /3 mL (0.083 %) nebulizer solution Take 3 mLs (2.5 mg total) by nebulization every 6 (six) hours as needed for Wheezing or Shortness of Breath. Rescue    ARIPiprazole (ABILIFY) 5 MG Tab TAKE 1 TABLET BY MOUTH AT BEDTIME ONCE DAILY . (Patient not taking: Reported on 5/23/2024)    cetirizine (ZYRTEC) 10 MG tablet Take 1 tablet (10 mg total) by mouth once daily.    cloNIDine (CATAPRES) 0.1 MG tablet TAKE 1 TABLET BY MOUTH AT BEDTIME ONCE DAILY .    FLUoxetine 40 MG capsule Take 1 capsule (40 mg total) by mouth once daily.    fluticasone propionate (FLONASE) 50 mcg/actuation nasal spray 1 spray (50 mcg total) by Each Nostril route once daily.    HOME NEBULIZER PLUS SIDESTREAM Katharina use as directed    hydrOXYzine pamoate (VISTARIL) 25 MG Cap TAKE 1 CAPSULE BY MOUTH EVERY 8 HOURS AS NEEDED FOR ANXIETY    ipratropium (ATROVENT) 42 mcg (0.06 %) nasal spray 1 spray by Each Nostril route 3 (three) times daily. for 5 days    lisinopriL (PRINIVIL,ZESTRIL) 40 MG tablet Take 1 tablet (40 mg total) by mouth once daily.    rOPINIRole (REQUIP) 0.25 MG tablet TAKE 1 TO 2 TABLETS BY MOUTH IN THE EVENING    rosuvastatin (CRESTOR) 20 MG tablet Take 1 tablet (20 mg total) by mouth every evening.    tiZANidine  (ZANAFLEX) 2 MG tablet Take 1 tablet (2 mg total) by mouth every 8 (eight) hours as needed (pain).     Family History       Problem Relation (Age of Onset)    Anxiety disorder Daughter    Brain cancer Maternal Uncle    Breast cancer Sister (66), Paternal Aunt    Depression Daughter    Diabetes Father, Maternal Grandfather, Paternal Grandmother    Heart attack Father    Heart disease Father, Paternal Aunt, Maternal Grandfather, Paternal Grandmother    Heart failure Father    Hyperlipidemia Mother, Father, Sister    Hypertension Mother, Father, Sister    No Known Problems Brother, Son, Son, Maternal Grandmother    Ovarian cancer Sister    Pancreatic cancer Maternal Uncle    Stroke Maternal Uncle, Paternal Aunt          Tobacco Use    Smoking status: Former     Current packs/day: 0.00     Average packs/day: 0.5 packs/day for 43.4 years (21.7 ttl pk-yrs)     Types: Cigarettes     Start date: 3/18/1979     Quit date: 2022     Years since quittin.8     Passive exposure: Past    Smokeless tobacco: Never   Substance and Sexual Activity    Alcohol use: Yes     Comment: occasionally    Drug use: Not Currently    Sexual activity: Yes     Partners: Male     Birth control/protection: See Surgical Hx     Review of Systems   Constitutional: Negative.   HENT: Negative.     Eyes: Negative.    Cardiovascular:  Positive for chest pain.   Respiratory: Negative.     Endocrine: Negative.    Hematologic/Lymphatic: Negative.    Skin: Negative.    Musculoskeletal: Negative.    Gastrointestinal: Negative.    Genitourinary: Negative.    Neurological: Negative.    Psychiatric/Behavioral: Negative.     Allergic/Immunologic: Negative.      Objective:     Vital Signs (Most Recent):  Temp: 97.9 °F (36.6 °C) (24 1115)  Pulse: 85 (24 1300)  Resp: 18 (24 1300)  BP: (!) 152/76 (24 1300)  SpO2: 97 % (24 1300) Vital Signs (24h Range):  Temp:  [96.2 °F (35.7 °C)-98.2 °F (36.8 °C)] 97.9 °F (36.6 °C)  Pulse:  [62-95]  85  Resp:  [7-24] 18  SpO2:  [94 %-100 %] 97 %  BP: (136-175)/(61-86) 152/76     Weight: 99.9 kg (220 lb 3.8 oz)  Body mass index is 40.28 kg/m².    SpO2: 97 %         Intake/Output Summary (Last 24 hours) at 5/26/2024 1342  Last data filed at 5/26/2024 1230  Gross per 24 hour   Intake 120 ml   Output --   Net 120 ml       Lines/Drains/Airways       Peripheral Intravenous Line  Duration                  Peripheral IV - Single Lumen 05/25/24 1459 18 G Posterior;Right Forearm <1 day                     Physical Exam  Constitutional:       Appearance: Normal appearance. She is well-developed.   HENT:      Head: Normocephalic.   Eyes:      Pupils: Pupils are equal, round, and reactive to light.   Cardiovascular:      Rate and Rhythm: Normal rate and regular rhythm.   Pulmonary:      Effort: Pulmonary effort is normal.      Breath sounds: Normal breath sounds.   Abdominal:      General: Bowel sounds are normal.      Palpations: Abdomen is soft.      Tenderness: There is no abdominal tenderness.   Musculoskeletal:         General: Normal range of motion.      Cervical back: Normal range of motion and neck supple.   Skin:     General: Skin is warm.   Neurological:      Mental Status: She is alert and oriented to person, place, and time.          Significant Labs:     DATA:     Laboratory:  CBC:  Recent Labs   Lab 04/24/24  1247 05/25/24  1533 05/26/24  0501   WBC 6.61 6.90 6.00   Hemoglobin 14.5 14.9 13.3   Hematocrit 44.2 45.5 41.0   Platelets 255 238 203       CHEMISTRIES:  Recent Labs   Lab 10/26/21  1007 04/22/22  0921 10/10/22  1103 04/24/24  1247 05/25/24  1533 05/26/24  0501   Glucose 91 91   < > 88 112 H 106   Sodium 139 142   < > 140 141 141   Potassium 4.2 4.1   < > 4.6 4.0 3.8   BUN 15 12   < > 12 13 16   Creatinine 0.8 0.8   < > 0.8 1.1 0.8   eGFR if African American >60.0 >60.0  --   --   --   --    eGFR if non African American >60.0 >60.0  --   --   --   --    Calcium 9.5 8.8   < > 9.6 9.2 8.7   Magnesium  --    --   --   --   --  2.0    < > = values in this interval not displayed.       CARDIAC BIOMARKERS:  Recent Labs   Lab 08/18/22  1040 08/18/23  1045 05/25/24  1533 05/25/24  1816 05/26/24  0501 05/26/24  1019   CPK 73 123  --   --   --   --    Troponin I  --   --    < > 0.011 <0.006 <0.006    < > = values in this interval not displayed.       COAGS:  Recent Labs   Lab 04/24/24  1247 05/25/24  1533 05/26/24  0501   INR 1.0 1.0 1.0       LIPIDS/LFTS:  Recent Labs   Lab 04/22/22  0921 11/01/22 0719 09/19/23 0835 01/13/24 2005 03/19/24  0709 04/24/24  1247 05/25/24  1533 05/26/24  0501   Cholesterol 139  --  253 H  --  214 H  --   --   --    Triglycerides 73  --  202 H  --  179 H  --   --   --    HDL 38 L  --  36 L  --  36 L  --   --   --    LDL Cholesterol 86.4  --  176.6 H  --  142.2  --   --   --    Non-HDL Cholesterol 101  --  217  --  178  --   --   --    AST 17   < > 22   < >  --  25 24 23   ALT 21   < > 36   < >  --  36 29 30    < > = values in this interval not displayed.       Hemoglobin A1C   Date Value Ref Range Status   03/19/2024 5.4 4.0 - 5.6 % Final     Comment:     ADA Screening Guidelines:  5.7-6.4%  Consistent with prediabetes  >or=6.5%  Consistent with diabetes    High levels of fetal hemoglobin interfere with the HbA1C  assay. Heterozygous hemoglobin variants (HbS, HgC, etc)do  not significantly interfere with this assay.   However, presence of multiple variants may affect accuracy.     09/19/2023 5.1 4.0 - 5.6 % Final     Comment:     ADA Screening Guidelines:  5.7-6.4%  Consistent with prediabetes  >or=6.5%  Consistent with diabetes    High levels of fetal hemoglobin interfere with the HbA1C  assay. Heterozygous hemoglobin variants (HbS, HgC, etc)do  not significantly interfere with this assay.   However, presence of multiple variants may affect accuracy.     03/15/2023 5.5 4.0 - 5.6 % Final     Comment:     ADA Screening Guidelines:  5.7-6.4%  Consistent with prediabetes  >or=6.5%  Consistent  with diabetes    High levels of fetal hemoglobin interfere with the HbA1C  assay. Heterozygous hemoglobin variants (HbS, HgC, etc)do  not significantly interfere with this assay.   However, presence of multiple variants may affect accuracy.         TSH  Recent Labs   Lab 03/15/23  0848 09/19/23  0835 03/19/24  0709   TSH 0.721 0.547 0.776       The 10-year ASCVD risk score (Shanice PERDUE, et al., 2019) is: 6.9%    Values used to calculate the score:      Age: 58 years      Sex: Female      Is Non- : No      Diabetic: No      Tobacco smoker: No      Systolic Blood Pressure: 152 mmHg      Is BP treated: Yes      HDL Cholesterol: 36 mg/dL      Total Cholesterol: 214 mg/dL       BNP    Lab Results   Component Value Date/Time    BNP <10 05/25/2024 03:33 PM    BNP <10 02/16/2020 01:12 AM            ECHO    Results for orders placed during the hospital encounter of 05/26/24    Echo    Interpretation Summary    Left Ventricle: The left ventricle is normal in size. Normal wall thickness. There is normal systolic function with a visually estimated ejection fraction of 55 - 60%. Grade I diastolic dysfunction.    Right Ventricle: Normal right ventricular cavity size. Systolic function is normal.    Left Atrium: Left atrium is mildly dilated.    IVC/SVC: Normal venous pressure at 3 mmHg.      STRESS TEST    No results found for this or any previous visit.        CATH    No results found for this or any previous visit.      Assessment and Plan:     Unstable angina   Patient with unstable angina.  Troponins negative.  Echo without any large wall motion abnormalities.  Plan for angiogram in a.m.   Aspirin Plavix.    Risks, benefits and alternatives of the catheterization procedure were discussed with the patient.The risks of coronary angiography include but are not limited to: bleeding, infection, death, heart attack, arrhythmia, kidney injury or failure, potential need for dialysis, allergic reactions, stroke,  need for emergency surgery, hematoma, pseudoaneurysm etc.  Should stenting be indicated, the patient has agreed to dual anti-platelet therapy for 1-consecutive year with a drug-eluting stent and a minimum of 1-month with the use of a bare metal stent. Additionally, pt is aware that non-compliance is likely to result in stent clotting with heart attack, heart failure, and/or death  The risks of moderate sedation include hypotension, respiratory depression, arrhythmias, bronchospasm, and death. Informed consent was obtained and the  patient is agreeable to proceed with the procedure. Consent was placed on the chart.      Class 3 severe obesity due to excess calories with serious comorbidity and body mass index (BMI) of 40.0 to 44.9 in adult  Body mass index is 40.28 kg/m². Morbid obesity complicates all aspects of disease management from diagnostic modalities to treatment. Weight loss encouraged and health benefits explained to patient.         Primary hypertension  Chronic, uncontrolled. Latest blood pressure and vitals reviewed-     Temp:  [96.2 °F (35.7 °C)-98.2 °F (36.8 °C)]   Pulse:  [62-95]   Resp:  [7-24]   BP: (136-175)/(61-86)   SpO2:  [94 %-100 %] .   Home meds for hypertension were reviewed and noted below.   Hypertension Medications               cloNIDine (CATAPRES) 0.1 MG tablet TAKE 1 TABLET BY MOUTH AT BEDTIME ONCE DAILY .    lisinopriL (PRINIVIL,ZESTRIL) 40 MG tablet Take 1 tablet (40 mg total) by mouth once daily.            While in the hospital, will manage blood pressure as follows; Continue home antihypertensive regimen    Will utilize p.r.n. blood pressure medication only if patient's blood pressure greater than 160/100 and she develops symptoms such as worsening chest pain or shortness of breath.    Obstructive sleep apnea (adult) (pediatric)            VTE Risk Mitigation (From admission, onward)           Ordered     enoxaparin injection 40 mg  Every 12 hours         05/26/24 0308     IP VTE  HIGH RISK PATIENT  Once         05/26/24 0308     Place sequential compression device  Until discontinued         05/26/24 0308                    Thank you for your consult. I will follow-up with patient. Please contact us if you have any additional questions.    Nikki Herrera MD  Cardiology   [facility] - Intensive Care      Critical Care Time:  45 minutes     Critical care was time spent personally by me on the following activities: development of treatment plan with patient or surrogate and bedside caregivers, discussions with consultants, evaluation of patient's response to treatment, examination of patient, ordering and performing treatments and interventions, ordering and review of laboratory studies, ordering and review of radiographic studies, pulse oximetry, re-evaluation of patient's condition. This critical care time did not overlap with that of any other provider or involve time for any procedures.

## 2024-05-26 NOTE — NURSING
Patient arrived to unit via WC and daughter, safely transferred into bed, alert and oriented x's 4, able to make needs known, resp even and unlabored, denies pain/discomfort at this time, vitals obtained, skin assessment completed, oriented to room, call light within reach, bed in low position, safety precautions maintained.

## 2024-05-26 NOTE — ASSESSMENT & PLAN NOTE
Body mass index is 40.28 kg/m². Morbid obesity complicates all aspects of disease management from diagnostic modalities to treatment. Weight loss encouraged and health benefits explained to patient.

## 2024-05-26 NOTE — ASSESSMENT & PLAN NOTE
Chronic, controlled. Latest blood pressure and vitals reviewed-     Temp:  [96.2 °F (35.7 °C)-98.2 °F (36.8 °C)]   Pulse:  [65-95]   Resp:  [14-24]   BP: (139-175)/(61-86)   SpO2:  [94 %-99 %] .   Home meds for hypertension were reviewed and noted below.   Hypertension Medications               cloNIDine (CATAPRES) 0.1 MG tablet TAKE 1 TABLET BY MOUTH AT BEDTIME ONCE DAILY .    lisinopriL (PRINIVIL,ZESTRIL) 40 MG tablet Take 1 tablet (40 mg total) by mouth once daily.            While in the hospital, will manage blood pressure as follows; Continue home antihypertensive regimen    Will utilize p.r.n. blood pressure medication only if patient's blood pressure greater than 180/110 and she develops symptoms such as worsening chest pain or shortness of breath.

## 2024-05-26 NOTE — PROGRESS NOTES
Pt sitting up in bed eating lunch, states she feel much better. Daughter @ bs. No distress noted. Will continue to monitor.

## 2024-05-26 NOTE — CARE UPDATE
Assumed care of Ms Chiquis Man who was admitted as transfer for chest pain with recent abnormal stress test. Started asa, plavix, statin. Trop negative x4. TTE normal EF, G1DD, no wall motion abnormalities. Cardiology consulted- plan [facility] on 5/27/24.     Swati Albert MD  05/26/2024 1:12 PM

## 2024-05-26 NOTE — EICU
"EICU Note    59 y/o female with a PMH of DORA, HTN, HLD, depression and restless leg syndrome presents with complaints of chest pain. Chest pain was sudden in onset and radiated to her jaw and left arm with diaphoresis, nausea and shortness of breath. This resolved with initial treatment.Transferred to [facility] from [facility] for further evaluation and management    Currently;    /63   Pulse 75   Temp 98.2 °F (36.8 °C) (Axillary)   Resp 19   Ht 5' 2" (1.575 m)   Wt 99.9 kg (220 lb 3.8 oz)   SpO2 95%   BMI 40.28 kg/m²     Labs:    CBC: WBC 6.90/Hgb 14.9/ Hct 45.5/ plts 238 K    BMP: Na 141/ K 4/ Cl 110/ CO2 20/ BUN 13/ Cr 1.1/ glucose 112    Troponin I : 0.006-->0.011    Chest xray: Final reading  Impression:  Normal chest.    EKG: My reading  RSR 68 bpm. T wave inverted v2-v4. No ST segment changes    Impression:  -Angina ACS  -HTN  -DORA    Plan; Continue home medications. Cardiology consult    "

## 2024-05-26 NOTE — ASSESSMENT & PLAN NOTE
Concern for ACS   Continue to trend troponin  Cardiology consult  Telemetry monitoring   Nitroglycerin PRN

## 2024-05-26 NOTE — HPI
Patient is a pleasant 58-year-old lady.  Medical history includes morbid obesity among other medical issues.  Has been having chest pains on and off.  Had a stress echo done which was abnormal.  States an outpatient angiogram has not been scheduled yet.  But came in with substernal chest pressure, radiating to her jaws.  Also symptoms of heartburn.  In ICU.  Received 3 nitroglycerin today with relief of chest pain.  Denies orthopnea, PND        Left Ventricle: The left ventricle is normal in size. Ventricular mass is normal. Normal wall thickness. There is normal systolic function. Ejection fraction by visual approximation is 60%. Global longitudinal strain is -17.0%. There is normal diastolic function.    Right Ventricle: Normal right ventricular cavity size. Wall thickness is normal. Right ventricle wall motion  is normal. Systolic function is normal.    Aortic Valve: The aortic valve is a trileaflet valve.    Mitral Valve: There is no stenosis.    Pulmonic Valve: There is no stenosis.    IVC/SVC: Normal venous pressure at 3 mmHg.    Stress Protocol: The patient was infused intravenously with dobutamine. The patient received a graduated infusion of the stress agent beginning at 10.0 mcg/kg/min to a peak dose of 30.0 mcg/kg/min. The peak heart rate was 146 bpm, which is 94% of age predicted maximum heart rate. The patient reported chest discomfort during the stress test. The test was stopped because the end of the protocol was reached.    Baseline ECG: The Baseline ECG reveals sinus rhythm.    Stress ECG: There is normal blood pressure response with stress.    Post-stress Echo: The post-stress echo showed worsened wall motion abnormalities compared to baseline which are indicative of myocardial ischemia.    Post-stress Impression: The study is consistent with ischemia.         Results for orders placed or performed in visit on 03/28/24   EKG 12-lead    Collection Time: 03/28/24 12:47 PM   Result Value Ref Range     QRS Duration 86 ms    [facility] QTC Calculation 441 ms    Narrative    Test Reason : Z01.818,R06.09,    Vent. Rate : 074 BPM     Atrial Rate : 074 BPM     P-R Int : 132 ms          QRS Dur : 086 ms      QT Int : 398 ms       P-R-T Axes : 063 063 037 degrees     QTc Int : 441 ms    Normal sinus rhythm  Within normal limits  When compared with ECG of 21-DEC-2023 09:54,  Nonspecific T wave abnormality, improved in Inferior leads  Confirmed by David Cantor MD ([id number]) on 3/30/2024 9:29:10 AM    Referred By: SINTIA SANDOVAL II           Confirmed By:David Cantor MD       No results found for this or any previous visit.      No results found for this or any previous visit.      No results found for this or any previous visit.      HPI: This is a 58-year-old female with a past medical history of hypertension, hyperlipidemia, DORA, depression, restless leg who was transferred from [facility] for evaluation of chest pain.     Patient presented to outside hospital ED on 05/25 with chest pain that started an hour and a half prior to presentation.  She developed sudden-onset substernal chest pain, with radiation to her jaw and left arm.  Associated symptoms included diaphoresis, shortness of breaths, and nausea.  She denies having similar episodes in the past or recurrence of her chest pain after receiving nitroglycerin.  Of note, the patient had a positive stress test on 4/24. While hospitalized, the patient remained hemodynamically stable.  Labs were largely unremarkable, including negative troponin (<0.006 > 0.011).  She was treated with aspirin 324 mg, a nitroglycerin 0.4 mg SL with improvement of her chest pain. Patient was transferred to [facility] for cardiology evaluation/potential PCI.

## 2024-05-26 NOTE — HPI
This is a 58-year-old female with a past medical history of hypertension, hyperlipidemia, DORA, depression, restless leg who was transferred from [facility] for evaluation of chest pain.    Patient presented to outside hospital ED on 05/25 with chest pain that started an hour and a half prior to presentation.  She developed sudden-onset substernal chest pain, with radiation to her jaw and left arm.  Associated symptoms included diaphoresis, shortness of breaths, and nausea.  She denies having similar episodes in the past or recurrence of her chest pain after receiving nitroglycerin.  Of note, the patient had a positive stress test on 4/24. While hospitalized, the patient remained hemodynamically stable.  Labs were largely unremarkable, including negative troponin (<0.006 > 0.011).  She was treated with aspirin 324 mg, a nitroglycerin 0.4 mg SL with improvement of her chest pain. Patient was transferred to [facility] for cardiology evaluation/potential PCI.

## 2024-05-26 NOTE — NURSING
[facility]  Nurses Note -- 4 Eyes        Date: 05/26/2024        Skin assessed on: Admit        [x] No Pressure Injuries Present                 []Prevention Measures Documented     [] Yes LDA Previously documented      [] Yes New Pressure Injury Discovered              [] LDA Added        Attending RN: MELISSA Leonardo     Second RN: TAHIR Foote

## 2024-05-26 NOTE — ASSESSMENT & PLAN NOTE
Chronic, uncontrolled. Latest blood pressure and vitals reviewed-     Temp:  [96.2 °F (35.7 °C)-98.2 °F (36.8 °C)]   Pulse:  [62-95]   Resp:  [7-24]   BP: (136-175)/(61-86)   SpO2:  [94 %-100 %] .   Home meds for hypertension were reviewed and noted below.   Hypertension Medications               cloNIDine (CATAPRES) 0.1 MG tablet TAKE 1 TABLET BY MOUTH AT BEDTIME ONCE DAILY .    lisinopriL (PRINIVIL,ZESTRIL) 40 MG tablet Take 1 tablet (40 mg total) by mouth once daily.            While in the hospital, will manage blood pressure as follows; Continue home antihypertensive regimen    Will utilize p.r.n. blood pressure medication only if patient's blood pressure greater than 160/100 and she develops symptoms such as worsening chest pain or shortness of breath.

## 2024-05-26 NOTE — SUBJECTIVE & OBJECTIVE
Past Medical History:   Diagnosis Date    Achilles tendinitis     Achilles tendinitis     Depression     Hyperlipidemia     Insomnia     Sleep apnea        Past Surgical History:   Procedure Laterality Date    BACK SURGERY      BLADDER SUSPENSION      BREAST BIOPSY Bilateral     multiple benign biopsies    CALCANEAL OSTEOTOMY  12/01/2022    Procedure: OSTEOTOMY, CALCANEUS;  Surgeon: Diaz Jordan MD;  Location: [facility];  Service: Orthopedics;;    COLONOSCOPY N/A 09/06/2019    Procedure: COLONOSCOPY;  Surgeon: Luis Bogran-Reyes, MD;  Location: [facility];  Service: Endoscopy;  Laterality: N/A;    DEBRIDEMENT OF TENDON Left 06/19/2020    Procedure: DEBRIDEMENT, TENDON;  Surgeon: Diaz Jordan MD;  Location: [facility] OR;  Service: Orthopedics;  Laterality: Left;  left achilles tendon    DEBRIDEMENT OF TENDON Right 12/01/2022    Procedure: DEBRIDEMENT, TENDON;  Surgeon: Diaz Jordan MD;  Location: [facility];  Service: Orthopedics;  Laterality: Right;    HYSTERECTOMY  12/2010    KNEE ARTHROSCOPY W/ MENISCAL REPAIR Left 2010    REPAIR OF ACHILLES TENDON  12/01/2022    Procedure: REPAIR, TENDON, ACHILLES;  Surgeon: Diaz Jordan MD;  Location: [facility];  Service: Orthopedics;;    TOTAL REDUCTION MAMMOPLASTY Bilateral 1991       Review of patient's allergies indicates:   Allergen Reactions    Codeine Other (See Comments)     Chest pain    Depo-provera [medroxyprogesterone]        Current Facility-Administered Medications on File Prior to Encounter   Medication    [COMPLETED] aspirin chewable tablet 324 mg    [COMPLETED] nitroGLYCERIN SL tablet 0.4 mg    [DISCONTINUED] acetaminophen tablet 650 mg    [DISCONTINUED] atorvastatin tablet 80 mg    [DISCONTINUED] dextrose 50% injection 12.5 g    [DISCONTINUED] dextrose 50% injection 25 g    [DISCONTINUED] enoxaparin injection 40 mg    [DISCONTINUED] FLUoxetine capsule 40 mg    [DISCONTINUED] glucagon (human recombinant) injection 1 mg    [DISCONTINUED] glucose chewable tablet 16 g     [DISCONTINUED] glucose chewable tablet 24 g    [DISCONTINUED] hydrOXYzine pamoate capsule 25 mg    [DISCONTINUED] naloxone 0.4 mg/mL injection 0.02 mg    [DISCONTINUED] rOPINIRole tablet 0.25 mg    [DISCONTINUED] sodium chloride 0.9% flush 10 mL     Current Outpatient Medications on File Prior to Encounter   Medication Sig    albuterol (PROVENTIL) 2.5 mg /3 mL (0.083 %) nebulizer solution Take 3 mLs (2.5 mg total) by nebulization every 6 (six) hours as needed for Wheezing or Shortness of Breath. Rescue    ARIPiprazole (ABILIFY) 5 MG Tab TAKE 1 TABLET BY MOUTH AT BEDTIME ONCE DAILY . (Patient not taking: Reported on 5/23/2024)    cetirizine (ZYRTEC) 10 MG tablet Take 1 tablet (10 mg total) by mouth once daily.    cloNIDine (CATAPRES) 0.1 MG tablet TAKE 1 TABLET BY MOUTH AT BEDTIME ONCE DAILY .    FLUoxetine 40 MG capsule Take 1 capsule (40 mg total) by mouth once daily.    fluticasone propionate (FLONASE) 50 mcg/actuation nasal spray 1 spray (50 mcg total) by Each Nostril route once daily.    HOME NEBULIZER PLUS SIDESTREAM Katharina use as directed    hydrOXYzine pamoate (VISTARIL) 25 MG Cap TAKE 1 CAPSULE BY MOUTH EVERY 8 HOURS AS NEEDED FOR ANXIETY    ipratropium (ATROVENT) 42 mcg (0.06 %) nasal spray 1 spray by Each Nostril route 3 (three) times daily. for 5 days    lisinopriL (PRINIVIL,ZESTRIL) 40 MG tablet Take 1 tablet (40 mg total) by mouth once daily.    rOPINIRole (REQUIP) 0.25 MG tablet TAKE 1 TO 2 TABLETS BY MOUTH IN THE EVENING    rosuvastatin (CRESTOR) 20 MG tablet Take 1 tablet (20 mg total) by mouth every evening.    tiZANidine (ZANAFLEX) 2 MG tablet Take 1 tablet (2 mg total) by mouth every 8 (eight) hours as needed (pain).     Family History       Problem Relation (Age of Onset)    Anxiety disorder Daughter    Brain cancer Maternal Uncle    Breast cancer Sister (66), Paternal Aunt    Depression Daughter    Diabetes Father, Maternal Grandfather, Paternal Grandmother    Heart attack Father    Heart disease  Father, Paternal Aunt, Maternal Grandfather, Paternal Grandmother    Heart failure Father    Hyperlipidemia Mother, Father, Sister    Hypertension Mother, Father, Sister    No Known Problems Brother, Son, Son, Maternal Grandmother    Ovarian cancer Sister    Pancreatic cancer Maternal Uncle    Stroke Maternal Uncle, Paternal Aunt          Tobacco Use    Smoking status: Former     Current packs/day: 0.00     Average packs/day: 0.5 packs/day for 43.4 years (21.7 ttl pk-yrs)     Types: Cigarettes     Start date: 3/18/1979     Quit date: 2022     Years since quittin.8     Passive exposure: Past    Smokeless tobacco: Never   Substance and Sexual Activity    Alcohol use: Yes     Comment: occasionally    Drug use: Not Currently    Sexual activity: Yes     Partners: Male     Birth control/protection: See Surgical Hx     Review of Systems   Constitutional:  Positive for diaphoresis.   HENT: Negative.     Eyes: Negative.    Respiratory: Negative.     Cardiovascular:  Positive for chest pain.   Gastrointestinal:  Positive for nausea.   Endocrine: Negative.    Genitourinary: Negative.    Musculoskeletal: Negative.    Skin: Negative.    Allergic/Immunologic: Negative.    Neurological: Negative.    Psychiatric/Behavioral: Negative.       Objective:     Vital Signs (Most Recent):  Temp: 98.2 °F (36.8 °C) (24 0300)  Pulse: 73 (24 0330)  Resp: 14 (24 0330)  BP: (!) 147/76 (24 0300)  SpO2: 97 % (24 0330) Vital Signs (24h Range):  Temp:  [96.2 °F (35.7 °C)-98.2 °F (36.8 °C)] 98.2 °F (36.8 °C)  Pulse:  [65-95] 73  Resp:  [14-24] 14  SpO2:  [94 %-99 %] 97 %  BP: (139-175)/(61-86) 147/76     Weight: 99.9 kg (220 lb 3.8 oz)  Body mass index is 40.28 kg/m².     Physical Exam  Vitals and nursing note reviewed.   Constitutional:       General: She is not in acute distress.     Appearance: Normal appearance. She is obese. She is not ill-appearing.   HENT:      Head: Normocephalic and atraumatic.       Nose: Nose normal.      Mouth/Throat:      Mouth: Mucous membranes are moist.   Eyes:      Extraocular Movements: Extraocular movements intact.   Cardiovascular:      Rate and Rhythm: Normal rate.      Pulses: Normal pulses.      Heart sounds: No murmur heard.  Pulmonary:      Effort: Pulmonary effort is normal. No respiratory distress.   Abdominal:      General: Abdomen is flat.      Palpations: Abdomen is soft.      Tenderness: There is no abdominal tenderness.   Musculoskeletal:      Right lower leg: No edema.      Left lower leg: No edema.   Skin:     General: Skin is warm.      Capillary Refill: Capillary refill takes less than 2 seconds.   Neurological:      General: No focal deficit present.      Mental Status: She is alert.   Psychiatric:         Mood and Affect: Mood normal.                Significant Labs: All pertinent labs within the past 24 hours have been reviewed.    Significant Imaging: I have reviewed all pertinent imaging results/findings within the past 24 hours.

## 2024-05-26 NOTE — NURSING
[facility]  Nurses Note -- 4 Eyes      5/26/2024       Skin assessed on: Admit      [x] No Pressure Injuries Present    [x]Prevention Measures Documented    [] Yes LDA  for Pressure Injury Previously documented     [] Yes New Pressure Injury Discovered   [] LDA for New Pressure Injury Added      Attending RN:  Miranda Aasen, RN     Second RN:  MELISSA Swift

## 2024-05-26 NOTE — ASSESSMENT & PLAN NOTE
Patient with unstable angina.  Troponins negative.  Echo without any large wall motion abnormalities.  Plan for angiogram in a.m.   Aspirin Plavix.    Risks, benefits and alternatives of the catheterization procedure were discussed with the patient.The risks of coronary angiography include but are not limited to: bleeding, infection, death, heart attack, arrhythmia, kidney injury or failure, potential need for dialysis, allergic reactions, stroke, need for emergency surgery, hematoma, pseudoaneurysm etc.  Should stenting be indicated, the patient has agreed to dual anti-platelet therapy for 1-consecutive year with a drug-eluting stent and a minimum of 1-month with the use of a bare metal stent. Additionally, pt is aware that non-compliance is likely to result in stent clotting with heart attack, heart failure, and/or death  The risks of moderate sedation include hypotension, respiratory depression, arrhythmias, bronchospasm, and death. Informed consent was obtained and the  patient is agreeable to proceed with the procedure. Consent was placed on the chart.

## 2024-05-26 NOTE — SUBJECTIVE & OBJECTIVE
Past Medical History:   Diagnosis Date    Achilles tendinitis     Achilles tendinitis     Depression     Hyperlipidemia     Insomnia     Sleep apnea        Past Surgical History:   Procedure Laterality Date    BACK SURGERY      BLADDER SUSPENSION      BREAST BIOPSY Bilateral     multiple benign biopsies    CALCANEAL OSTEOTOMY  12/01/2022    Procedure: OSTEOTOMY, CALCANEUS;  Surgeon: Diaz Jordan MD;  Location: [facility];  Service: Orthopedics;;    COLONOSCOPY N/A 09/06/2019    Procedure: COLONOSCOPY;  Surgeon: Luis Bogran-Reyes, MD;  Location: [facility];  Service: Endoscopy;  Laterality: N/A;    DEBRIDEMENT OF TENDON Left 06/19/2020    Procedure: DEBRIDEMENT, TENDON;  Surgeon: Diaz Jordan MD;  Location: [facility] OR;  Service: Orthopedics;  Laterality: Left;  left achilles tendon    DEBRIDEMENT OF TENDON Right 12/01/2022    Procedure: DEBRIDEMENT, TENDON;  Surgeon: Diaz Jordan MD;  Location: [facility];  Service: Orthopedics;  Laterality: Right;    HYSTERECTOMY  12/2010    KNEE ARTHROSCOPY W/ MENISCAL REPAIR Left 2010    REPAIR OF ACHILLES TENDON  12/01/2022    Procedure: REPAIR, TENDON, ACHILLES;  Surgeon: Diaz Jordan MD;  Location: [facility];  Service: Orthopedics;;    TOTAL REDUCTION MAMMOPLASTY Bilateral 1991       Review of patient's allergies indicates:   Allergen Reactions    Codeine Other (See Comments)     Chest pain    Depo-provera [medroxyprogesterone]        Current Facility-Administered Medications on File Prior to Encounter   Medication    [COMPLETED] aspirin chewable tablet 324 mg    [COMPLETED] nitroGLYCERIN SL tablet 0.4 mg    [DISCONTINUED] acetaminophen tablet 650 mg    [DISCONTINUED] atorvastatin tablet 80 mg    [DISCONTINUED] dextrose 50% injection 12.5 g    [DISCONTINUED] dextrose 50% injection 25 g    [DISCONTINUED] enoxaparin injection 40 mg    [DISCONTINUED] FLUoxetine capsule 40 mg    [DISCONTINUED] glucagon (human recombinant) injection 1 mg    [DISCONTINUED] glucose chewable tablet 16 g     [DISCONTINUED] glucose chewable tablet 24 g    [DISCONTINUED] hydrOXYzine pamoate capsule 25 mg    [DISCONTINUED] naloxone 0.4 mg/mL injection 0.02 mg    [DISCONTINUED] rOPINIRole tablet 0.25 mg    [DISCONTINUED] sodium chloride 0.9% flush 10 mL     Current Outpatient Medications on File Prior to Encounter   Medication Sig    albuterol (PROVENTIL) 2.5 mg /3 mL (0.083 %) nebulizer solution Take 3 mLs (2.5 mg total) by nebulization every 6 (six) hours as needed for Wheezing or Shortness of Breath. Rescue    ARIPiprazole (ABILIFY) 5 MG Tab TAKE 1 TABLET BY MOUTH AT BEDTIME ONCE DAILY . (Patient not taking: Reported on 5/23/2024)    cetirizine (ZYRTEC) 10 MG tablet Take 1 tablet (10 mg total) by mouth once daily.    cloNIDine (CATAPRES) 0.1 MG tablet TAKE 1 TABLET BY MOUTH AT BEDTIME ONCE DAILY .    FLUoxetine 40 MG capsule Take 1 capsule (40 mg total) by mouth once daily.    fluticasone propionate (FLONASE) 50 mcg/actuation nasal spray 1 spray (50 mcg total) by Each Nostril route once daily.    HOME NEBULIZER PLUS SIDESTREAM Katharina use as directed    hydrOXYzine pamoate (VISTARIL) 25 MG Cap TAKE 1 CAPSULE BY MOUTH EVERY 8 HOURS AS NEEDED FOR ANXIETY    ipratropium (ATROVENT) 42 mcg (0.06 %) nasal spray 1 spray by Each Nostril route 3 (three) times daily. for 5 days    lisinopriL (PRINIVIL,ZESTRIL) 40 MG tablet Take 1 tablet (40 mg total) by mouth once daily.    rOPINIRole (REQUIP) 0.25 MG tablet TAKE 1 TO 2 TABLETS BY MOUTH IN THE EVENING    rosuvastatin (CRESTOR) 20 MG tablet Take 1 tablet (20 mg total) by mouth every evening.    tiZANidine (ZANAFLEX) 2 MG tablet Take 1 tablet (2 mg total) by mouth every 8 (eight) hours as needed (pain).     Family History       Problem Relation (Age of Onset)    Anxiety disorder Daughter    Brain cancer Maternal Uncle    Breast cancer Sister (66), Paternal Aunt    Depression Daughter    Diabetes Father, Maternal Grandfather, Paternal Grandmother    Heart attack Father    Heart disease  Father, Paternal Aunt, Maternal Grandfather, Paternal Grandmother    Heart failure Father    Hyperlipidemia Mother, Father, Sister    Hypertension Mother, Father, Sister    No Known Problems Brother, Son, Son, Maternal Grandmother    Ovarian cancer Sister    Pancreatic cancer Maternal Uncle    Stroke Maternal Uncle, Paternal Aunt          Tobacco Use    Smoking status: Former     Current packs/day: 0.00     Average packs/day: 0.5 packs/day for 43.4 years (21.7 ttl pk-yrs)     Types: Cigarettes     Start date: 3/18/1979     Quit date: 2022     Years since quittin.8     Passive exposure: Past    Smokeless tobacco: Never   Substance and Sexual Activity    Alcohol use: Yes     Comment: occasionally    Drug use: Not Currently    Sexual activity: Yes     Partners: Male     Birth control/protection: See Surgical Hx     Review of Systems   Constitutional: Negative.   HENT: Negative.     Eyes: Negative.    Cardiovascular:  Positive for chest pain.   Respiratory: Negative.     Endocrine: Negative.    Hematologic/Lymphatic: Negative.    Skin: Negative.    Musculoskeletal: Negative.    Gastrointestinal: Negative.    Genitourinary: Negative.    Neurological: Negative.    Psychiatric/Behavioral: Negative.     Allergic/Immunologic: Negative.      Objective:     Vital Signs (Most Recent):  Temp: 97.9 °F (36.6 °C) (24 1115)  Pulse: 85 (24 1300)  Resp: 18 (24 1300)  BP: (!) 152/76 (24 1300)  SpO2: 97 % (24 1300) Vital Signs (24h Range):  Temp:  [96.2 °F (35.7 °C)-98.2 °F (36.8 °C)] 97.9 °F (36.6 °C)  Pulse:  [62-95] 85  Resp:  [7-24] 18  SpO2:  [94 %-100 %] 97 %  BP: (136-175)/(61-86) 152/76     Weight: 99.9 kg (220 lb 3.8 oz)  Body mass index is 40.28 kg/m².    SpO2: 97 %         Intake/Output Summary (Last 24 hours) at 2024 1342  Last data filed at 2024 1230  Gross per 24 hour   Intake 120 ml   Output --   Net 120 ml       Lines/Drains/Airways       Peripheral Intravenous Line   Duration                  Peripheral IV - Single Lumen 05/25/24 1459 18 G Posterior;Right Forearm <1 day                     Physical Exam  Constitutional:       Appearance: Normal appearance. She is well-developed.   HENT:      Head: Normocephalic.   Eyes:      Pupils: Pupils are equal, round, and reactive to light.   Cardiovascular:      Rate and Rhythm: Normal rate and regular rhythm.   Pulmonary:      Effort: Pulmonary effort is normal.      Breath sounds: Normal breath sounds.   Abdominal:      General: Bowel sounds are normal.      Palpations: Abdomen is soft.      Tenderness: There is no abdominal tenderness.   Musculoskeletal:         General: Normal range of motion.      Cervical back: Normal range of motion and neck supple.   Skin:     General: Skin is warm.   Neurological:      Mental Status: She is alert and oriented to person, place, and time.          Significant Labs:     DATA:     Laboratory:  CBC:  Recent Labs   Lab 04/24/24  1247 05/25/24  1533 05/26/24  0501   WBC 6.61 6.90 6.00   Hemoglobin 14.5 14.9 13.3   Hematocrit 44.2 45.5 41.0   Platelets 255 238 203       CHEMISTRIES:  Recent Labs   Lab 10/26/21  1007 04/22/22  0921 10/10/22  1103 04/24/24  1247 05/25/24  1533 05/26/24  0501   Glucose 91 91   < > 88 112 H 106   Sodium 139 142   < > 140 141 141   Potassium 4.2 4.1   < > 4.6 4.0 3.8   BUN 15 12   < > 12 13 16   Creatinine 0.8 0.8   < > 0.8 1.1 0.8   eGFR if African American >60.0 >60.0  --   --   --   --    eGFR if non African American >60.0 >60.0  --   --   --   --    Calcium 9.5 8.8   < > 9.6 9.2 8.7   Magnesium  --   --   --   --   --  2.0    < > = values in this interval not displayed.       CARDIAC BIOMARKERS:  Recent Labs   Lab 08/18/22  1040 08/18/23  1045 05/25/24  1533 05/25/24  1816 05/26/24  0501 05/26/24  1019   CPK 73 123  --   --   --   --    Troponin I  --   --    < > 0.011 <0.006 <0.006    < > = values in this interval not displayed.       COAGS:  Recent Labs   Lab  04/24/24  1247 05/25/24  1533 05/26/24  0501   INR 1.0 1.0 1.0       LIPIDS/LFTS:  Recent Labs   Lab 04/22/22  0921 11/01/22  0719 09/19/23  0835 01/13/24 2005 03/19/24  0709 04/24/24  1247 05/25/24  1533 05/26/24  0501   Cholesterol 139  --  253 H  --  214 H  --   --   --    Triglycerides 73  --  202 H  --  179 H  --   --   --    HDL 38 L  --  36 L  --  36 L  --   --   --    LDL Cholesterol 86.4  --  176.6 H  --  142.2  --   --   --    Non-HDL Cholesterol 101  --  217  --  178  --   --   --    AST 17   < > 22   < >  --  25 24 23   ALT 21   < > 36   < >  --  36 29 30    < > = values in this interval not displayed.       Hemoglobin A1C   Date Value Ref Range Status   03/19/2024 5.4 4.0 - 5.6 % Final     Comment:     ADA Screening Guidelines:  5.7-6.4%  Consistent with prediabetes  >or=6.5%  Consistent with diabetes    High levels of fetal hemoglobin interfere with the HbA1C  assay. Heterozygous hemoglobin variants (HbS, HgC, etc)do  not significantly interfere with this assay.   However, presence of multiple variants may affect accuracy.     09/19/2023 5.1 4.0 - 5.6 % Final     Comment:     ADA Screening Guidelines:  5.7-6.4%  Consistent with prediabetes  >or=6.5%  Consistent with diabetes    High levels of fetal hemoglobin interfere with the HbA1C  assay. Heterozygous hemoglobin variants (HbS, HgC, etc)do  not significantly interfere with this assay.   However, presence of multiple variants may affect accuracy.     03/15/2023 5.5 4.0 - 5.6 % Final     Comment:     ADA Screening Guidelines:  5.7-6.4%  Consistent with prediabetes  >or=6.5%  Consistent with diabetes    High levels of fetal hemoglobin interfere with the HbA1C  assay. Heterozygous hemoglobin variants (HbS, HgC, etc)do  not significantly interfere with this assay.   However, presence of multiple variants may affect accuracy.         TSH  Recent Labs   Lab 03/15/23  0848 09/19/23  0835 03/19/24  0709   TSH 0.721 0.547 0.776       The 10-year ASCVD risk  score (Shanice PERDUE, et al., 2019) is: 6.9%    Values used to calculate the score:      Age: 58 years      Sex: Female      Is Non- : No      Diabetic: No      Tobacco smoker: No      Systolic Blood Pressure: 152 mmHg      Is BP treated: Yes      HDL Cholesterol: 36 mg/dL      Total Cholesterol: 214 mg/dL       BNP    Lab Results   Component Value Date/Time    BNP <10 05/25/2024 03:33 PM    BNP <10 02/16/2020 01:12 AM            ECHO    Results for orders placed during the hospital encounter of 05/26/24    Echo    Interpretation Summary    Left Ventricle: The left ventricle is normal in size. Normal wall thickness. There is normal systolic function with a visually estimated ejection fraction of 55 - 60%. Grade I diastolic dysfunction.    Right Ventricle: Normal right ventricular cavity size. Systolic function is normal.    Left Atrium: Left atrium is mildly dilated.    IVC/SVC: Normal venous pressure at 3 mmHg.      STRESS TEST    No results found for this or any previous visit.        CATH    No results found for this or any previous visit.

## 2024-05-27 VITALS
SYSTOLIC BLOOD PRESSURE: 149 MMHG | TEMPERATURE: 98 F | WEIGHT: 220.25 LBS | RESPIRATION RATE: 20 BRPM | HEART RATE: 75 BPM | OXYGEN SATURATION: 95 % | HEIGHT: 62 IN | BODY MASS INDEX: 40.53 KG/M2 | DIASTOLIC BLOOD PRESSURE: 75 MMHG

## 2024-05-27 LAB
ANION GAP SERPL CALC-SCNC: 7 MMOL/L (ref 8–16)
BUN SERPL-MCNC: 14 MG/DL (ref 6–20)
CALCIUM SERPL-MCNC: 8.4 MG/DL (ref 8.7–10.5)
CHLORIDE SERPL-SCNC: 110 MMOL/L (ref 95–110)
CO2 SERPL-SCNC: 23 MMOL/L (ref 23–29)
CREAT SERPL-MCNC: 0.7 MG/DL (ref 0.5–1.4)
EST. GFR  (NO RACE VARIABLE): >60 ML/MIN/1.73 M^2
GLUCOSE SERPL-MCNC: 99 MG/DL (ref 70–110)
POTASSIUM SERPL-SCNC: 4.1 MMOL/L (ref 3.5–5.1)
SODIUM SERPL-SCNC: 140 MMOL/L (ref 136–145)

## 2024-05-27 PROCEDURE — 4A023N7 MEASUREMENT OF CARDIAC SAMPLING AND PRESSURE, LEFT HEART, PERCUTANEOUS APPROACH: ICD-10-PCS | Performed by: INTERNAL MEDICINE

## 2024-05-27 PROCEDURE — 25000003 PHARM REV CODE 250: Performed by: HOSPITALIST

## 2024-05-27 PROCEDURE — 25000003 PHARM REV CODE 250: Performed by: INTERNAL MEDICINE

## 2024-05-27 PROCEDURE — B2111ZZ FLUOROSCOPY OF MULTIPLE CORONARY ARTERIES USING LOW OSMOLAR CONTRAST: ICD-10-PCS | Performed by: INTERNAL MEDICINE

## 2024-05-27 PROCEDURE — 25000003 PHARM REV CODE 250: Performed by: STUDENT IN AN ORGANIZED HEALTH CARE EDUCATION/TRAINING PROGRAM

## 2024-05-27 PROCEDURE — C1769 GUIDE WIRE: HCPCS | Performed by: INTERNAL MEDICINE

## 2024-05-27 PROCEDURE — 63600175 PHARM REV CODE 636 W HCPCS: Performed by: INTERNAL MEDICINE

## 2024-05-27 PROCEDURE — C1887 CATHETER, GUIDING: HCPCS | Performed by: INTERNAL MEDICINE

## 2024-05-27 PROCEDURE — 25500020 PHARM REV CODE 255: Performed by: INTERNAL MEDICINE

## 2024-05-27 PROCEDURE — 93458 L HRT ARTERY/VENTRICLE ANGIO: CPT | Mod: 26,,, | Performed by: INTERNAL MEDICINE

## 2024-05-27 PROCEDURE — C1894 INTRO/SHEATH, NON-LASER: HCPCS | Performed by: INTERNAL MEDICINE

## 2024-05-27 PROCEDURE — 93458 L HRT ARTERY/VENTRICLE ANGIO: CPT | Performed by: INTERNAL MEDICINE

## 2024-05-27 PROCEDURE — 80048 BASIC METABOLIC PNL TOTAL CA: CPT | Performed by: HOSPITALIST

## 2024-05-27 PROCEDURE — 36415 COLL VENOUS BLD VENIPUNCTURE: CPT | Performed by: HOSPITALIST

## 2024-05-27 PROCEDURE — 99152 MOD SED SAME PHYS/QHP 5/>YRS: CPT | Mod: ,,, | Performed by: INTERNAL MEDICINE

## 2024-05-27 RX ORDER — FENTANYL CITRATE 50 UG/ML
INJECTION, SOLUTION INTRAMUSCULAR; INTRAVENOUS
Status: DISCONTINUED | OUTPATIENT
Start: 2024-05-27 | End: 2024-05-27 | Stop reason: HOSPADM

## 2024-05-27 RX ORDER — HEPARIN SODIUM 1000 [USP'U]/ML
INJECTION, SOLUTION INTRAVENOUS; SUBCUTANEOUS
Status: DISCONTINUED | OUTPATIENT
Start: 2024-05-27 | End: 2024-05-27 | Stop reason: HOSPADM

## 2024-05-27 RX ORDER — ONDANSETRON HYDROCHLORIDE 2 MG/ML
4 INJECTION, SOLUTION INTRAVENOUS EVERY 12 HOURS PRN
Status: DISCONTINUED | OUTPATIENT
Start: 2024-05-27 | End: 2024-05-27 | Stop reason: HOSPADM

## 2024-05-27 RX ORDER — MIDAZOLAM HYDROCHLORIDE 1 MG/ML
INJECTION, SOLUTION INTRAMUSCULAR; INTRAVENOUS
Status: DISCONTINUED | OUTPATIENT
Start: 2024-05-27 | End: 2024-05-27 | Stop reason: HOSPADM

## 2024-05-27 RX ORDER — LIDOCAINE HYDROCHLORIDE 10 MG/ML
INJECTION, SOLUTION EPIDURAL; INFILTRATION; INTRACAUDAL; PERINEURAL
Status: DISCONTINUED | OUTPATIENT
Start: 2024-05-27 | End: 2024-05-27 | Stop reason: HOSPADM

## 2024-05-27 RX ORDER — ASPIRIN 81 MG/1
81 TABLET ORAL DAILY
Qty: 30 TABLET | Refills: 11 | Status: SHIPPED | OUTPATIENT
Start: 2024-05-28 | End: 2025-05-28

## 2024-05-27 RX ORDER — ACETAMINOPHEN 325 MG/1
650 TABLET ORAL EVERY 4 HOURS PRN
Status: DISCONTINUED | OUTPATIENT
Start: 2024-05-27 | End: 2024-05-27 | Stop reason: HOSPADM

## 2024-05-27 RX ADMIN — ARIPIPRAZOLE 5 MG: 5 TABLET ORAL at 10:05

## 2024-05-27 RX ADMIN — ASPIRIN 81 MG: 81 TABLET, COATED ORAL at 10:05

## 2024-05-27 RX ADMIN — CLOPIDOGREL BISULFATE 75 MG: 75 TABLET ORAL at 10:05

## 2024-05-27 RX ADMIN — PANTOPRAZOLE SODIUM 40 MG: 40 TABLET, DELAYED RELEASE ORAL at 10:05

## 2024-05-27 RX ADMIN — SODIUM CHLORIDE: 9 INJECTION, SOLUTION INTRAVENOUS at 12:05

## 2024-05-27 RX ADMIN — FLUOXETINE 40 MG: 20 CAPSULE ORAL at 10:05

## 2024-05-27 RX ADMIN — DIPHENHYDRAMINE HYDROCHLORIDE 50 MG: 25 CAPSULE ORAL at 07:05

## 2024-05-27 RX ADMIN — CLONIDINE HYDROCHLORIDE 0.1 MG: 0.1 TABLET ORAL at 12:05

## 2024-05-27 NOTE — NURSING
[facility]  Nurses Note -- 4 Eyes      5/26/2024       Skin assessed on: Q Shift      [x] No Pressure Injuries Present    []Prevention Measures Documented    [] Yes LDA  for Pressure Injury Previously documented     [] Yes New Pressure Injury Discovered   [] LDA for New Pressure Injury Added      Attending RN:  Dallas Chinchilla RN     Second RN:  MELISSA Leonardo

## 2024-05-27 NOTE — NURSING
Discharge instructions handed to pt and daughter. Both verbalizes understanding. Denies any questions. IV discontinued and catheter intact. Vital signs stable, NADN, and afebrile. Cardiac monitoring dc and tele monitor tech notified. Discharged home via wheelchair with family at bedside.

## 2024-05-27 NOTE — NURSING
[facility] [place] -- 4 Eyes        Date: 05/27/2024        Skin assessed on: Discharge        [x] No Pressure Injuries Present                 []Prevention Measures Documented     [] Yes LDA Previously documented      [] Yes New Pressure Injury Discovered              [] LDA Added        Attending RN: MELISSA Leonardo     Second RN:  TAHIR Booth

## 2024-05-27 NOTE — OR NURSING
Vasc band removed, gauze and tegaderm dressing applied, wrist splint reapplied  instructed pt in avoiding bending wrist, in signs of problem at arterial site, and manage dressing.pt agrees to follow instructions

## 2024-05-27 NOTE — NURSING
Patient transferred off unit via bed to cath lab, denies any pain/discomfort at this time, family at bedside.

## 2024-05-27 NOTE — DISCHARGE SUMMARY
[facility]  Discharge Summary      Patient Name: Chiquis Man  MRN: [id number]  ENMA: [id number]  Patient Class: IP- Inpatient  Admission Date: 5/26/2024  Hospital Length of Stay: 1 days  Discharge Date and Time: No discharge date for patient encounter.  Attending Physician: Arthur Lara MD   Discharging Provider: Arthur Lara MD  Primary Care Provider: William Walton II, NP    Primary Care Team: Networked reference to record PCT     HPI:   This is a 58-year-old female with a past medical history of hypertension, hyperlipidemia, DORA, depression, restless leg who was transferred from [facility] for evaluation of chest pain.    Patient presented to outside hospital ED on 05/25 with chest pain that started an hour and a half prior to presentation.  She developed sudden-onset substernal chest pain, with radiation to her jaw and left arm.  Associated symptoms included diaphoresis, shortness of breaths, and nausea.  She denies having similar episodes in the past or recurrence of her chest pain after receiving nitroglycerin.  Of note, the patient had a positive stress test on 4/24. While hospitalized, the patient remained hemodynamically stable.  Labs were largely unremarkable, including negative troponin (<0.006 > 0.011).  She was treated with aspirin 324 mg, a nitroglycerin 0.4 mg SL with improvement of her chest pain. Patient was transferred to [facility] for cardiology evaluation/potential PCI.     Procedure(s) (LRB):  Left heart cath (Left)      Hospital Course:    Ms. Man is a 59 yo F who was admitted as transfer for chest pain with recent abnormal stress test. Started asa, plavix, statin. Trop negative x4. TTE normal EF, G1DD, no wall motion abnormalities. Cardiology consulted- plan C on 5/27/24. No luminal irregularities noted on cardiac catheterization. Recommending starting aspirin daily. Overall, feeling well and stable for discharge home.      Goals of Care Treatment Preferences:  Code Status: Full Code      Consults:   Consults (From admission, onward)          Status Ordering Provider     Inpatient consult to Cardiology  Once        Provider:  Nikki Herrera MD    Completed DAVID HOGUE            No new Assessment & Plan notes have been filed under this hospital service since the last note was generated.  Service: Hospital Medicine    Final Active Diagnoses:    Diagnosis Date Noted POA    PRINCIPAL PROBLEM:  Unstable angina [I20.0] 05/25/2024 Yes    Hyperlipidemia [E78.5] 05/25/2024 Yes    Class 3 severe obesity due to excess calories with serious comorbidity and body mass index (BMI) of 40.0 to 44.9 in adult [E66.01, Z68.41] 03/28/2024 Not Applicable    Primary hypertension [I10] 03/28/2024 Yes    Obstructive sleep apnea (adult) (pediatric) [G47.33]  Yes      Problems Resolved During this Admission:    Diagnosis Date Noted Date Resolved POA    Unstable angina [I20.0] 05/26/2024 05/26/2024 Yes       Discharged Condition: stable    Disposition: Home or Self Care    Follow Up:   Follow-up Information       [facility].    Why: Follow up with Cardiology within 14 days of discharge  Contact information:  [address] [place], [place] [address] [address]  [phone]                           Patient Instructions:      Diet Cardiac     Notify your health care provider if you experience any of the following:  temperature >100.4     Notify your health care provider if you experience any of the following:  persistent nausea and vomiting or diarrhea     Notify your health care provider if you experience any of the following:  severe uncontrolled pain     Notify your health care provider if you experience any of the following:  difficulty breathing or increased cough     Notify your health care provider if you experience any of the following:  severe persistent headache     Notify your health care provider if you experience any  of the following:  worsening rash     Notify your health care provider if you experience any of the following:  persistent dizziness, light-headedness, or visual disturbances     Activity as tolerated       Significant Diagnostic Studies: Labs: All labs within the past 24 hours have been reviewed  No orders to display   Results for orders placed during the hospital encounter of 05/26/24    Echo    Interpretation Summary    Left Ventricle: The left ventricle is normal in size. Normal wall thickness. There is normal systolic function with a visually estimated ejection fraction of 55 - 60%. Grade I diastolic dysfunction.    Right Ventricle: Normal right ventricular cavity size. Systolic function is normal.    Left Atrium: Left atrium is mildly dilated.    IVC/SVC: Normal venous pressure at 3 mmHg.      Pending Diagnostic Studies:       Procedure Component Value Units Date/Time    EKG 12-LEAD on arrival to floor [[id number]]     Order Status: Sent Lab Status: No result            Medications:  Reconciled Home Medications:      Medication List        START taking these medications      aspirin 81 MG EC tablet  Commonly known as: ECOTRIN  Take 1 tablet (81 mg total) by mouth once daily.  Start taking on: May 28, 2024            CONTINUE taking these medications      albuterol 2.5 mg /3 mL (0.083 %) nebulizer solution  Commonly known as: PROVENTIL  Take 3 mLs (2.5 mg total) by nebulization every 6 (six) hours as needed for Wheezing or Shortness of Breath. Rescue     cetirizine 10 MG tablet  Commonly known as: ZYRTEC  Take 1 tablet (10 mg total) by mouth once daily.     cloNIDine 0.1 MG tablet  Commonly known as: CATAPRES  TAKE 1 TABLET BY MOUTH AT BEDTIME ONCE DAILY .     FLUoxetine 40 MG capsule  Take 1 capsule (40 mg total) by mouth once daily.     fluticasone propionate 50 mcg/actuation nasal spray  Commonly known as: FLONASE  1 spray (50 mcg total) by Each Nostril route once daily.     HOME NEBULIZER PLUS SIDESTREAM  Katharina  Generic drug: nebulizer and compressor  use as directed     hydrOXYzine pamoate 25 MG Cap  Commonly known as: VISTARIL  TAKE 1 CAPSULE BY MOUTH EVERY 8 HOURS AS NEEDED FOR ANXIETY     ipratropium 42 mcg (0.06 %) nasal spray  Commonly known as: ATROVENT  1 spray by Each Nostril route 3 (three) times daily. for 5 days     lisinopriL 40 MG tablet  Commonly known as: PRINIVIL,ZESTRIL  Take 1 tablet (40 mg total) by mouth once daily.     rOPINIRole 0.25 MG tablet  Commonly known as: REQUIP  TAKE 1 TO 2 TABLETS BY MOUTH IN THE EVENING     rosuvastatin 20 MG tablet  Commonly known as: CRESTOR  Take 1 tablet (20 mg total) by mouth every evening.     tiZANidine 2 MG tablet  Commonly known as: ZANAFLEX  Take 1 tablet (2 mg total) by mouth every 8 (eight) hours as needed (pain).            ASK your doctor about these medications      ARIPiprazole 5 MG Tab  Commonly known as: ABILIFY  TAKE 1 TABLET BY MOUTH AT BEDTIME ONCE DAILY .              Indwelling Lines/Drains at time of discharge:   Lines/Drains/Airways       None                   Time spent on the discharge of patient: >35 minutes         Arthur Lara MD  Department of Hospital Medicine  [facility]

## 2024-05-27 NOTE — PLAN OF CARE
Problem: Adult Inpatient Plan of Care  Goal: Plan of Care Review  Outcome: Met  Goal: Patient-Specific Goal (Individualized)  Outcome: Met  Goal: Absence of Hospital-Acquired Illness or Injury  Outcome: Met  Goal: Optimal Comfort and Wellbeing  Outcome: Met  Goal: Readiness for Transition of Care  Outcome: Met     Problem: Bariatric Environmental Safety  Goal: Safety Maintained with Care  Outcome: Met     Problem: Wound  Goal: Optimal Coping  Outcome: Met  Goal: Optimal Functional Ability  Outcome: Met  Goal: Absence of Infection Signs and Symptoms  Outcome: Met  Goal: Improved Oral Intake  Outcome: Met  Goal: Optimal Pain Control and Function  Outcome: Met  Goal: Skin Health and Integrity  Outcome: Met  Goal: Optimal Wound Healing  Outcome: Met

## 2024-05-27 NOTE — PLAN OF CARE
Problem: Adult Inpatient Plan of Care  Goal: Plan of Care Review  Outcome: Progressing  Flowsheets (Taken 5/27/2024 0533)  Plan of Care Reviewed With: patient  Goal: Optimal Comfort and Wellbeing  Outcome: Progressing  Intervention: Monitor Pain and Promote Comfort  Flowsheets (Taken 5/27/2024 0533)  Pain Management Interventions:   pillow support provided   quiet environment facilitated   position adjusted     Problem: Wound  Goal: Optimal Coping  Outcome: Progressing

## 2024-05-27 NOTE — PLAN OF CARE
"Case Management Assessment     PCP: William Walton II NP    Pharmacy:   [facility] [address]  [place], [place] - [address]  [address] [place] [address]  Phone: [phone] Fax: [phone]      Patient Arrived From: Home  Existing Help at Home: Patient is independent, but Viktoria Wheeler (Sister)  [phone], is available to assist at home.    Barriers to Discharge: None    Discharge Plan:    A. Home with family   B. Home with family  Spoke with patient and family at bedside; she is alert and oriented.  Pt stated she does take medication, but often times forget and falls asleep prior to taking her "nighttime medications". Pt's sister, Viktoria Wheeler, lives in the home with patient and stated she will assist in reminding patient to take her medication.  CM to continue to assist for and until discharge.      05/27/24 1525   Discharge Assessment   Assessment Type Discharge Planning Assessment   Confirmed/corrected address, phone number and insurance Yes   Confirmed Demographics Correct on Facesheet   Source of Information patient   Communicated BISMARK with patient/caregiver Yes   Reason For Admission Unstable angina   People in Home sibling(s)   Facility Arrived From: transferred from [facility]   Do you expect to return to your current living situation? Yes   Do you have help at home or someone to help you manage your care at home? Yes   Who are your caregiver(s) and their phone number(s)? Viktoria Wheeler (Sister)  [phone]   Prior to hospitilization cognitive status: Unable to Assess   Current cognitive status: Alert/Oriented   Equipment Currently Used at Home CPAP;nebulizer   Readmission within 30 days? Yes   Patient currently being followed by outpatient case management? No   Do you currently have service(s) that help you manage your care at home? No   Do you take prescription medications? Yes   Do you have prescription coverage? Yes   Coverage [facility] CHOICE PLUS   Do " "you have any problems affording any of your prescribed medications? No   Is the patient taking medications as prescribed? no   If no, which medications is patient not taking? "Nighttime medications"   Who is going to help you get home at discharge? arik washington (Sister)  [phone]   How do you get to doctors appointments? car, drives self   Are you on dialysis? No   Do you take coumadin? No   Discharge Plan A Home;Home with family   Discharge Plan B Home;Home with family   DME Needed Upon Discharge  none   Discharge Plan discussed with: Adult children;Sibling;Patient   Name(s) and Number(s) arik washington (Sister)  [phone]   Transition of Care Barriers None     "

## 2024-05-27 NOTE — NURSING
Patient returned back to unit via bed, eyes opened, resp even and unlabored, denies any pain/discomfort at this time, family at bedside, TR band to right wrist only to prevent pt from bending wrist forward, no signs/symptoms of bleeding at this time, IV infusing without difficulty, bed in low position, call light within reach

## 2024-05-27 NOTE — HOSPITAL COURSE
Ms. Man is a 59 yo F who was admitted as transfer for chest pain with recent abnormal stress test. Started asa, plavix, statin. Trop negative x4. TTE normal EF, G1DD, no wall motion abnormalities. Cardiology consulted- plan [facility] on 5/27/24. No luminal irregularities noted on cardiac catheterization. Recommending starting aspirin daily. Overall, feeling well and stable for discharge home.

## 2024-05-27 NOTE — PLAN OF CARE
Case Management Final Discharge Note      Discharge Disposition: Home    New DME ordered / company name: None    Relevant SDOH / Transition of Care Barriers:  [place]    Primary Caretaker and contact information: Patient is independent, but her sister, Viktoria Wheeler [phone], is available to assist as needed.    Scheduled followup appointment: PCP follow up appointment scheduled.  Message sent to Cardiology Clinic in [facility].  Clinic to contact patient to schedule follow up    Referrals placed: None    Transportation: Private vehicle/family taking patient home        Patient and family educated on discharge services and updated on DC plan. Bedside RN notified, patient clear to discharge from Case Management Perspective.      05/27/24 [id number]   Final Note   Assessment Type Final Discharge Note   Anticipated Discharge Disposition Home   Hospital Resources/Appts/Education Provided Provided patient/caregiver with written discharge plan information;Post-Acute resouces added to AVS   Post-Acute Status   Coverage [facility] CHOICE PLUS   Discharge Delays None known at this time

## 2024-05-28 ENCOUNTER — PATIENT OUTREACH (OUTPATIENT)
Dept: ADMINISTRATIVE | Facility: CLINIC | Age: 59
End: 2024-05-28
Payer: COMMERCIAL

## 2024-05-28 NOTE — PROGRESS NOTES
C3 nurse spoke with Chiquis Man  for a TCC post hospital discharge follow up call. The patient has a scheduled HOSFU appointment with William Walton II, NP  on 5/31/24 @ 1100.

## 2024-08-07 PROBLEM — I25.10 NON-OCCLUSIVE CORONARY ARTERY DISEASE: Status: ACTIVE | Noted: 2024-08-07

## 2024-08-07 PROBLEM — Z78.9 FALSE POSITIVE CARDIAC STRESS TEST: Status: ACTIVE | Noted: 2024-08-07

## 2024-08-07 PROBLEM — I20.89 EQUIVALENT ANGINA: Status: RESOLVED | Noted: 2024-03-28 | Resolved: 2024-08-07

## 2024-08-07 PROBLEM — I20.0 UNSTABLE ANGINA: Status: RESOLVED | Noted: 2024-05-25 | Resolved: 2024-08-07

## 2024-08-07 PROBLEM — I07.1 TRICUSPID REGURGITATION: Status: ACTIVE | Noted: 2024-08-07

## 2024-08-07 PROBLEM — Z98.890 S/P LEFT HEART CATHETERIZATION BY PERCUTANEOUS APPROACH: Status: ACTIVE | Noted: 2024-08-07

## 2024-08-07 PROBLEM — Z01.810 PREOP CARDIOVASCULAR EXAM: Status: ACTIVE | Noted: 2024-08-07

## 2024-08-30 ENCOUNTER — PATIENT OUTREACH (OUTPATIENT)
Dept: ADMINISTRATIVE | Facility: HOSPITAL | Age: 59
End: 2024-08-30

## 2024-10-07 PROBLEM — M17.12 PRIMARY OSTEOARTHRITIS OF LEFT KNEE: Status: ACTIVE | Noted: 2024-10-07

## 2025-01-23 PROBLEM — Z96.652 S/P TOTAL KNEE ARTHROPLASTY, LEFT: Status: ACTIVE | Noted: 2025-01-23

## 2025-01-29 PROBLEM — M25.662 DECREASED ROM OF LEFT KNEE: Status: ACTIVE | Noted: 2025-01-29

## 2025-03-31 ENCOUNTER — PATIENT MESSAGE (OUTPATIENT)
Dept: ADMINISTRATIVE | Facility: HOSPITAL | Age: 60
End: 2025-03-31

## 2025-04-01 ENCOUNTER — PATIENT OUTREACH (OUTPATIENT)
Dept: ADMINISTRATIVE | Facility: HOSPITAL | Age: 60
End: 2025-04-01

## 2025-04-01 VITALS — DIASTOLIC BLOOD PRESSURE: 73 MMHG | SYSTOLIC BLOOD PRESSURE: 102 MMHG

## 2025-04-01 NOTE — PROGRESS NOTES
Portal active: Yes  Chart reviewed, immunization record updated.  No new results noted on [facility] or [facility] web site.  Care Everywhere updated.   Patient care coordination note  Upcoming PCP visit updated.  Next PCP visit 04/02/2025  LOV with PCP 09/26/2024    Patient responded to an outreach for blood pressure and provided a remote BP reading, chart updated.

## 2025-05-14 ENCOUNTER — OFFICE VISIT (OUTPATIENT)
Dept: URGENT CARE | Facility: CLINIC | Age: 60
End: 2025-05-14
Payer: COMMERCIAL

## 2025-05-14 VITALS
HEIGHT: 62 IN | SYSTOLIC BLOOD PRESSURE: 132 MMHG | TEMPERATURE: 98 F | RESPIRATION RATE: 19 BRPM | OXYGEN SATURATION: 99 % | WEIGHT: 215.19 LBS | HEART RATE: 93 BPM | BODY MASS INDEX: 39.6 KG/M2 | DIASTOLIC BLOOD PRESSURE: 79 MMHG

## 2025-05-14 DIAGNOSIS — R51.9 NONINTRACTABLE HEADACHE, UNSPECIFIED CHRONICITY PATTERN, UNSPECIFIED HEADACHE TYPE: Primary | ICD-10-CM

## 2025-05-14 PROCEDURE — 96372 THER/PROPH/DIAG INJ SC/IM: CPT | Mod: S$GLB,,, | Performed by: NURSE PRACTITIONER

## 2025-05-14 PROCEDURE — 99214 OFFICE O/P EST MOD 30 MIN: CPT | Mod: 25,S$GLB,, | Performed by: NURSE PRACTITIONER

## 2025-05-14 RX ORDER — BUTALBITAL, ACETAMINOPHEN AND CAFFEINE 50; 325; 40 MG/1; MG/1; MG/1
1 TABLET ORAL EVERY 4 HOURS PRN
Qty: 12 TABLET | Refills: 0 | Status: SHIPPED | OUTPATIENT
Start: 2025-05-14 | End: 2025-05-14

## 2025-05-14 RX ORDER — BUTALBITAL, ACETAMINOPHEN AND CAFFEINE 50; 325; 40 MG/1; MG/1; MG/1
1 TABLET ORAL EVERY 4 HOURS PRN
Qty: 12 TABLET | Refills: 0 | Status: SHIPPED | OUTPATIENT
Start: 2025-05-14 | End: 2025-05-17

## 2025-05-14 RX ORDER — KETOROLAC TROMETHAMINE 30 MG/ML
30 INJECTION, SOLUTION INTRAMUSCULAR; INTRAVENOUS
Status: COMPLETED | OUTPATIENT
Start: 2025-05-14 | End: 2025-05-14

## 2025-05-14 RX ADMIN — KETOROLAC TROMETHAMINE 30 MG: 30 INJECTION, SOLUTION INTRAMUSCULAR; INTRAVENOUS at 07:05

## 2025-05-14 NOTE — PROGRESS NOTES
"Subjective:      Patient ID: Chiquis Man is a 59 y.o. female.    Vitals:  height is 5' 1.89" (1.572 m) and weight is 97.6 kg (215 lb 2.7 oz). Her oral temperature is 98.3 °F (36.8 °C). Her blood pressure is 132/79 and her pulse is 93. Her respiration is 19 and oxygen saturation is 99%.     Chief Complaint: Headache    Patient states she has been having a headache for the passed 3 days.    Headache   This is a new problem. Episode onset: 05/12/25. The problem occurs every few minutes. The problem has been gradually worsening. The pain is located in the Temporal region. The pain quality is similar to prior headaches. The quality of the pain is described as pulsating. The pain is at a severity of 7/10. The pain is moderate. Associated symptoms include blurred vision, dizziness and vomiting (2 days ago which resolved). Pertinent negatives include no fever. Associated symptoms comments: Right eye pressure. The symptoms are aggravated by bright light. She has tried acetaminophen (excedrin) for the symptoms. The treatment provided mild relief.       Constitution: Negative for activity change, appetite change and fever.   Eyes:  Positive for blurred vision.   Gastrointestinal:  Positive for vomiting (2 days ago which resolved).   Neurological:  Positive for dizziness and headaches.      Objective:     Physical Exam   Constitutional: normal  HENT:   Head: Normocephalic.   Ears:   Right Ear: Tympanic membrane normal.   Left Ear: Tympanic membrane normal.   Nose: Nose normal.   Eyes: Conjunctivae are normal. Pupils are equal, round, and reactive to light.   Cardiovascular: Normal rate.   Pulmonary/Chest: Effort normal and breath sounds normal.   Abdominal: Normal appearance and bowel sounds are normal.   Musculoskeletal: Normal range of motion.         General: Normal range of motion.   Neurological: no focal deficit. She is alert and at baseline. She displays no weakness and normal reflexes. No cranial nerve " deficit or sensory deficit. Coordination and gait normal.   Skin: Skin is warm.   Nursing note and vitals reviewed.      Assessment:     1. Nonintractable headache, unspecified chronicity pattern, unspecified headache type        Plan:       Nonintractable headache, unspecified chronicity pattern, unspecified headache type    Other orders  -     ketorolac injection 30 mg  -     Discontinue: butalbital-acetaminophen-caffeine -40 mg (FIORICET, ESGIC) -40 mg per tablet; Take 1 tablet by mouth every 4 (four) hours as needed for Pain.  Dispense: 12 tablet; Refill: 0  -     butalbital-acetaminophen-caffeine -40 mg (FIORICET, ESGIC) -40 mg per tablet; Take 1 tablet by mouth every 4 (four) hours as needed for Pain.  Dispense: 12 tablet; Refill: 0          Medical Decision Making:   Urgent Care Management:  Patient has a history of migraines with an initially elevated blood pressure which normalized in clinic. Upon evaluation patient has a normal neurologic exam , no focal deficits . Dicussed strict follow up with primary care- ED if develops worsening symptom

## 2025-05-14 NOTE — LETTER
May 14, 2025      [facility] [place]  [address], [address]  [place] [address]  Phone: [phone]  Fax: [phone]       Patient: Chiquis Man   YOB: 1965  Date of Visit: 05/14/2025    To Whom It May Concern:    Corby Man  was at [facility] on 05/14/2025. The patient may return to work/school on 05/19/2025 with no restrictions. If you have any questions or concerns, or if I can be of further assistance, please do not hesitate to contact me.    Sincerely,    Leidy Gilliland MA

## 2025-05-15 NOTE — PATIENT INSTRUCTIONS
Take medication as directed  Strict follow up with primary care 2 days  Go to the emergency department if symptoms worsen

## 2025-05-23 ENCOUNTER — OFFICE VISIT (OUTPATIENT)
Dept: URGENT CARE | Facility: CLINIC | Age: 60
End: 2025-05-23
Payer: COMMERCIAL

## 2025-05-23 VITALS
DIASTOLIC BLOOD PRESSURE: 86 MMHG | WEIGHT: 215 LBS | SYSTOLIC BLOOD PRESSURE: 149 MMHG | OXYGEN SATURATION: 98 % | TEMPERATURE: 99 F | RESPIRATION RATE: 19 BRPM | BODY MASS INDEX: 39.56 KG/M2 | HEIGHT: 62 IN | HEART RATE: 96 BPM

## 2025-05-23 DIAGNOSIS — R39.15 URGENCY OF URINATION: Primary | ICD-10-CM

## 2025-05-23 DIAGNOSIS — N39.0 URINARY TRACT INFECTION WITHOUT HEMATURIA, SITE UNSPECIFIED: ICD-10-CM

## 2025-05-23 LAB
BILIRUBIN, UA POC OHS: ABNORMAL
BLOOD, UA POC OHS: ABNORMAL
CLARITY, UA POC OHS: ABNORMAL
COLOR, UA POC OHS: ABNORMAL
GLUCOSE, UA POC OHS: NEGATIVE
KETONES, UA POC OHS: ABNORMAL
LEUKOCYTES, UA POC OHS: ABNORMAL
NITRITE, UA POC OHS: POSITIVE
PH, UA POC OHS: 6.5
PROTEIN, UA POC OHS: >=300
SPECIFIC GRAVITY, UA POC OHS: 1.02
UROBILINOGEN, UA POC OHS: 1

## 2025-05-23 RX ORDER — PHENAZOPYRIDINE HYDROCHLORIDE 200 MG/1
200 TABLET, FILM COATED ORAL 3 TIMES DAILY PRN
Qty: 12 TABLET | Refills: 0 | Status: SHIPPED | OUTPATIENT
Start: 2025-05-23 | End: 2026-05-23

## 2025-05-23 RX ORDER — SULFAMETHOXAZOLE AND TRIMETHOPRIM 800; 160 MG/1; MG/1
1 TABLET ORAL 2 TIMES DAILY
Qty: 20 TABLET | Refills: 0 | Status: SHIPPED | OUTPATIENT
Start: 2025-05-23

## 2025-05-23 RX ORDER — AMLODIPINE BESYLATE 5 MG/1
5 TABLET ORAL
COMMUNITY
Start: 2025-05-16

## 2025-05-23 NOTE — PROGRESS NOTES
"Subjective:      Patient ID: Chiquis Man is a 59 y.o. female.    Vitals:  height is 5' 1.89" (1.572 m) and weight is 97.5 kg (215 lb). Her oral temperature is 98.8 °F (37.1 °C). Her blood pressure is 149/86 (abnormal) and her pulse is 96. Her respiration is 19 and oxygen saturation is 98%.     Chief Complaint: Dysuria    Patient is coming in for dysuria that started this morning. She states she took 2 Azo's and having pressure.    Dysuria   This is a new problem. The current episode started today. The problem occurs every urination. The problem has been gradually worsening. Quality: pressure. The pain is at a severity of 0/10. The patient is experiencing no pain. There has been no fever. She is Not sexually active. There is No history of pyelonephritis. Associated symptoms include frequency and urgency. Treatments tried: Azo. The treatment provided mild relief. There is no history of kidney stones.       Constitution: Negative.   HENT: Negative.     Cardiovascular: Negative.    Eyes: Negative.    Respiratory: Negative.     Gastrointestinal: Negative.    Endocrine: negative.   Genitourinary:  Positive for dysuria, frequency and urgency.   Musculoskeletal: Negative.    Skin: Negative.    Allergic/Immunologic: Negative.    Neurological: Negative.    Hematologic/Lymphatic: Negative.    Psychiatric/Behavioral: Negative.        Objective:     Physical Exam   Constitutional: She is oriented to person, place, and time. She appears well-developed.   HENT:   Head: Normocephalic and atraumatic.   Ears:   Right Ear: External ear normal.   Left Ear: External ear normal.   Nose: Nose normal. No nasal deformity. No epistaxis.   Mouth/Throat: Oropharynx is clear and moist and mucous membranes are normal.   Eyes: Lids are normal.   Neck: Trachea normal and phonation normal. Neck supple.   Cardiovascular: Normal pulses.   Pulmonary/Chest: Effort normal.   Abdominal: Normal appearance and bowel sounds are normal. She " exhibits no distension. Soft. There is abdominal tenderness in the suprapubic area. There is no rigidity, no rebound, no guarding, no tenderness at McBurney's point and negative Wilburn's sign.   Neurological: She is alert and oriented to person, place, and time.   Skin: Skin is warm, dry and intact.   Psychiatric: Her speech is normal and behavior is normal.   Nursing note and vitals reviewed.    Results for orders placed or performed in visit on 05/23/25   POCT Urinalysis(Instrument)    Collection Time: 05/23/25  5:11 PM   Result Value Ref Range    Color, POC UA Orange (A) Yellow, Straw, Colorless    Clarity, POC UA Cloudy (A) Clear    Glucose, POC UA Negative Negative    Bilirubin, POC UA Small (A) Negative    Ketones, POC UA Trace (A) Negative    Spec Grav POC UA 1.025 1.005 - 1.030    Blood, POC UA Large (A) Negative    pH, POC UA 6.5 5.0 - 8.0    Protein, POC UA >=300 (A) Negative    Urobilinogen, POC UA 1.0 <=1.0    Nitrite, POC UA Positive (A) Negative    WBC, POC UA Small (A) Negative         Assessment:     1. Urgency of urination    2. Urinary tract infection without hematuria, site unspecified        Plan:       Urgency of urination  -     POCT Urinalysis(Instrument)    Urinary tract infection without hematuria, site unspecified  -     sulfamethoxazole-trimethoprim 800-160mg (BACTRIM DS) 800-160 mg Tab; Take 1 tablet by mouth 2 (two) times daily.  Dispense: 20 tablet; Refill: 0  -     phenazopyridine (PYRIDIUM) 200 MG tablet; Take 1 tablet (200 mg total) by mouth 3 (three) times daily as needed for Pain.  Dispense: 12 tablet; Refill: 0      Please return here or go to the Emergency Department for any concerns or worsening of condition.  If you were prescribed antibiotics, please take them to completion.  If you were prescribed a narcotic medication, do not drive or operate heavy equipment or machinery while taking these medications.  Please follow up with your primary care doctor or specialist as  needed.  Please drink plenty of fluids.  Please get plenty of rest.  If you were prescribed Pyridium (phenazopyridine), please be aware that if you wear contact lens that this medication may stain your contacts.  While taking this medication it is recommended that you do not wear your contacts until 24 hours after your last dose.    Push fluids aggressively to improve symptoms and wash through the infection.  Cranberry juice can help relieve symptoms  If you  smoke, please stop smoking.    Please follow up with your primary care doctor or specialist as needed.    William Walton II, NP  [phone]    You must understand that you have received treatment at an Urgent Care facility only, and that you may be  released before all of your medical problems are known or treated. Urgent Care facilities are not equipped to  handle life threatening emergencies. It is recommended that you seek care at an Emergency Department for  further evaluation of worsening or concerning symptoms, or possibly life threatening conditions as  discussed.

## 2025-05-23 NOTE — LETTER
May 23, 2025  Chiquis Man  [address] [place] [address]                [facility] [place]  [address]  [place] [address]  Phone: [phone]  Fax: [phone] Chiquis Man was seen and treated in our Urgent Care department on 5/23/2025. She may return to work in 2 - 3 days.      If you have any questions or concerns, please don't hesitate to call.        Sincerely,        Brandon Mendenhall MD

## 2025-05-23 NOTE — PATIENT INSTRUCTIONS
Please return here or go to the Emergency Department for any concerns or worsening of condition.  If you were prescribed antibiotics, please take them to completion.  If you were prescribed a narcotic medication, do not drive or operate heavy equipment or machinery while taking these medications.  Please follow up with your primary care doctor or specialist as needed.  Please drink plenty of fluids.  Please get plenty of rest.  If you were prescribed Pyridium (phenazopyridine), please be aware that if you wear contact lens that this medication may stain your contacts.  While taking this medication it is recommended that you do not wear your contacts until 24 hours after your last dose.    Push fluids aggressively to improve symptoms and wash through the infection.  Cranberry juice can help relieve symptoms  If you  smoke, please stop smoking.    Please follow up with your primary care doctor or specialist as needed.    William Walton II, NP  [phone]    You must understand that you have received treatment at an Urgent Care facility only, and that you may be  released before all of your medical problems are known or treated. Urgent Care facilities are not equipped to  handle life threatening emergencies. It is recommended that you seek care at an Emergency Department for  further evaluation of worsening or concerning symptoms, or possibly life threatening conditions as  discussed.    Bladder Infection, Female (Adult)    Urine is normally doesn't have any bacteria in it. But bacteria can get into the urinary tract from the skin around the rectum. Or they can travel in the blood from elsewhere in the body. Once they are in your urinary tract, they can cause infection in the urethra (urethritis), the bladder (cystitis), or the kidneys (pyelonephritis).  The most common place for an infection is in the bladder. This is called a bladder infection. This is one of the most common infections in women. Most bladder  "infections are easily treated. They are not serious unless the infection spreads to the kidney.  The phrases "bladder infection," "UTI," and "cystitis" are often used to describe the same thing. But they are not always the same. Cystitis is an inflammation of the bladder. The most common cause of cystitis is an infection.  Symptoms  The infection causes inflammation in the urethra and bladder. This causes many of the symptoms. The most common symptoms of a bladder infection are:  Pain or burning when urinating  Having to urinate more often than usual  Urgent need to urinate  Only a small amount of urine comes out  Blood in urine  Abdominal discomfort. This is usually in the lower abdomen above the pubic bone.  Cloudy urine  Strong- or bad-smelling urine  Unable to urinate (urinary retention)  Unable to hold urine in (urinary incontinence)  Fever  Loss of appetite  Confusion (in older adults)  Causes  Bladder infections are not contagious. You can't get one from someone else, from a toilet seat, or from sharing a bath.  The most common cause of bladder infections is bacteria from the bowels. The bacteria get onto the skin around the opening of the urethra. From there, they can get into the urine and travel up to the bladder, causing inflammation and infection. This usually happens because of:  Wiping improperly after urinating. Always wipe from front to back.  Bowel incontinence  Pregnancy  Procedures such as having a catheter inserted  Older age  Not emptying your bladder. This can allow bacteria a chance to grow in your urine.  Dehydration  Constipation  Sex  Use of a diaphragm for birth control   Treatment  Bladder infections are diagnosed by a urine test. They are treated with antibiotics and usually clear up quickly without complications. Treatment helps prevent a more serious kidney infection.  Medicines  Medicines can help in the treatment of a bladder infection:  Take antibiotics until they are used up, even " if you feel better. It is important to finish them to make sure the infection has cleared.  You can use acetaminophen or ibuprofen for pain, fever, or discomfort, unless another medicine was prescribed. If you have chronic liver or kidney disease, talk with your healthcare provider before using these medicines. Also talk with your provider if you've ever had a stomach ulcer or gastrointestinal bleeding, or are taking blood-thinner medicines.  If you are given phenazopydridine to reduce burning with urination, it will cause your urine to become a bright orange color. This can stain clothing.  Care and prevention  These self-care steps can help prevent future infections:  Drink plenty of fluids to prevent dehydration and flush out your bladder. Do this unless you must restrict fluids for other health reasons, or your doctor told you not to.  Proper cleaning after going to the bathroom is important. Wipe from front to back after using the toilet to prevent the spread of bacteria.  Urinate more often. Don't try to hold urine in for a long time.  Wear loose-fitting clothes and cotton underwear. Avoid tight-fitting pants.  Improve your diet and prevent constipation. Eat more fresh fruit and vegetables, and fiber, and less junk and fatty foods.  Avoid sex until your symptoms are gone.  Avoid caffeine, alcohol, and spicy foods. These can irritate your bladder.  Urinate right after intercourse to flush out your bladder.  If you use birth control pills and have frequent bladder infections, discuss it with your doctor.  Follow-up care  Call your healthcare provider if all symptoms are not gone after 3 days of treatment. This is especially important if you have repeat infections.  If a culture was done, you will be told if your treatment needs to be changed. If directed, you can call to find out the results.  If X-rays were done, you will be told if the results will affect your treatment.  Call 911  Call 911 if any of the  following occur:  Trouble breathing  Hard to wake up or confusion  Fainting or loss of consciousness  Rapid heart rate  When to seek medical advice  Call your healthcare provider right away if any of these occur:  Fever of 100.4ºF (38.0ºC) or higher, or as directed by your healthcare provider  Symptoms are not better by the third day of treatment  Back or belly (abdominal) pain that gets worse  Repeated vomiting, or unable to keep medicine down  Weakness or dizziness  Vaginal discharge  Pain, redness, or swelling in the outer vaginal area (labia)  Date Last Reviewed: 10/1/2016  © 2024-8955 [employer]. [address] [place] [address]. All rights reserved. This information is not intended as a substitute for professional medical care. Always follow your healthcare professional's instructions.

## 2025-06-06 NOTE — Clinical Note
The catheter was inserted into the ostium   right coronary artery. An angiography was performed of the right coronary arteries. Multiple views were taken. 0 (no pain/absence of nonverbal indicators of pain)

## 2025-06-20 ENCOUNTER — OFFICE VISIT (OUTPATIENT)
Dept: URGENT CARE | Facility: CLINIC | Age: 60
End: 2025-06-20
Payer: COMMERCIAL

## 2025-06-20 VITALS
OXYGEN SATURATION: 99 % | RESPIRATION RATE: 16 BRPM | WEIGHT: 214.94 LBS | BODY MASS INDEX: 40.58 KG/M2 | HEART RATE: 68 BPM | DIASTOLIC BLOOD PRESSURE: 68 MMHG | SYSTOLIC BLOOD PRESSURE: 132 MMHG | TEMPERATURE: 98 F | HEIGHT: 61 IN

## 2025-06-20 DIAGNOSIS — M25.562 PAIN AND SWELLING OF LEFT KNEE: ICD-10-CM

## 2025-06-20 DIAGNOSIS — M25.462 PAIN AND SWELLING OF LEFT KNEE: ICD-10-CM

## 2025-06-20 DIAGNOSIS — M25.562 ACUTE PAIN OF LEFT KNEE: Primary | ICD-10-CM

## 2025-06-20 PROCEDURE — 73562 X-RAY EXAM OF KNEE 3: CPT | Mod: LT,S$GLB,, | Performed by: RADIOLOGY

## 2025-06-20 NOTE — PROGRESS NOTES
"Subjective:      Patient ID: Chiquis Man is a 59 y.o. female.    Vitals:  height is 5' 1" (1.549 m) and weight is 97.5 kg (214 lb 15.2 oz). Her oral temperature is 97.9 °F (36.6 °C). Her blood pressure is 132/68 and her pulse is 68. Her respiration is 16 and oxygen saturation is 99%.     Chief Complaint: Knee Pain    Pt reports to the clinic c/o left knee pain.  She reports inflammation, tenderness, and posterior bruising.  She reports pain with walking.  She reports knee replacement sx in January. She reports pain has been intermittent since sx, but has progressively gotten worse over the last 2 weeks.   She reports pain radiates down her leg.  She reports taking tylenol qam and qhs.     Knee Pain   The incident occurred more than 1 week ago. Injury mechanism: previous sx. The pain is present in the left knee. The quality of the pain is described as aching, burning and shooting. The pain is at a severity of 8/10. The pain is moderate. The pain has been Worsening since onset. Associated symptoms include an inability to bear weight. She reports no foreign bodies present. The symptoms are aggravated by movement, palpation and weight bearing. She has tried acetaminophen for the symptoms. The treatment provided no relief.       Musculoskeletal:  Positive for pain.   Skin:  Positive for bruising.      Objective:     Physical Exam   Constitutional: She is oriented to person, place, and time. She appears well-developed. She is cooperative.  Non-toxic appearance. She does not appear ill. No distress.   HENT:   Head: Normocephalic and atraumatic.   Ears:   Right Ear: Hearing and external ear normal.   Left Ear: Hearing and external ear normal.   Nose: Nose normal.   Eyes: Conjunctivae and lids are normal. No scleral icterus.   Neck: Phonation normal. Neck supple.   Cardiovascular: Normal rate and normal pulses.   Pulmonary/Chest: Effort normal. No respiratory distress.   Abdominal: Normal appearance. "   Musculoskeletal:      Comments: Left knee- pain with ROM, especially extension. Diffuse swelling noted to knee and calf/down lower leg. No erythema. No palpable cords. Intact distal pulses with no sensory deficits. Capillary refill less than 2 secs. No bony deformity or bony TTP. Pain to posterior knee and lateral inferior knee. Post surgical scar to anterior knee that is well healed, clean, dry and intact   Neurological: She is alert and oriented to person, place, and time. Coordination normal.   Skin: Skin is intact, not diaphoretic and not pale.   Psychiatric: Her speech is normal and behavior is normal. Judgment and thought content normal.   Nursing note and vitals reviewed.      Assessment:     1. Acute pain of left knee    2. Pain and swelling of left knee        Plan:       Acute pain of left knee  -     XR KNEE 3 VIEW LEFT; Future; Expected date: 06/20/2025    Pain and swelling of left knee    XR KNEE 3 VIEW LEFT  Result Date: 6/20/2025  EXAMINATION: XR KNEE 3 VIEW LEFT CLINICAL HISTORY: Pain in left knee FINDINGS: Knee three views left. There is a TKA in place good alignment and no complication seen. Electronically signed by: Aayz Powell MD Date:    06/20/2025 Time:    15:56    Patient expresses concerns for possible DVT. Discussed with patient limitations of urgent care and that she would need ultrasound to further assess. Late in afternoon on Friday, recommend ED for ultrasound. She states understanding.     Recommend elevation, rest, NSAIDs and ice. Discussed with patient the importance of f/u with their primary care provider. Urged to go to the ER for any worsening signs or symptoms.         Medical Decision Making:   Independently Interpreted Test(s):   I have ordered and independently interpreted X-rays - see summary below.       <> Summary of X-Ray Reading(s): Left knee- no acute fracture or dislocation. Hard ware intact with no evidence of migration

## 2025-06-20 NOTE — PATIENT INSTRUCTIONS
You must understand that you have received treatment at an Urgent Care facility only, and that you may be  released before all of your medical problems are known or treated. Urgent Care facilities are not equipped to  handle life threatening emergencies. It is recommended that you seek care at an Emergency Department for  further evaluation of worsening or concerning symptoms, or possibly life threatening conditions as  discussed.      ER if concerns for DVT- Ultrasound recommended to rule out

## 2025-07-07 PROBLEM — M25.562 CHRONIC PAIN OF LEFT KNEE: Status: ACTIVE | Noted: 2025-07-07

## 2025-07-07 PROBLEM — G89.29 CHRONIC PAIN OF LEFT KNEE: Status: ACTIVE | Noted: 2025-07-07

## 2025-08-18 PROBLEM — G89.29 CHRONIC PAIN OF LEFT KNEE: Status: RESOLVED | Noted: 2025-07-07 | Resolved: 2025-08-18

## 2025-08-18 PROBLEM — M25.562 CHRONIC PAIN OF LEFT KNEE: Status: RESOLVED | Noted: 2025-07-07 | Resolved: 2025-08-18

## 2025-08-19 PROBLEM — G89.29 OTHER CHRONIC PAIN: Status: ACTIVE | Noted: 2025-08-19

## 2025-08-19 PROBLEM — R76.8 POSITIVE ANA (ANTINUCLEAR ANTIBODY): Status: ACTIVE | Noted: 2025-08-19

## 2025-08-19 PROBLEM — M15.0 PRIMARY OSTEOARTHRITIS INVOLVING MULTIPLE JOINTS: Status: ACTIVE | Noted: 2025-08-19

## 2025-08-27 ENCOUNTER — PATIENT OUTREACH (OUTPATIENT)
Dept: ADMINISTRATIVE | Facility: HOSPITAL | Age: 60
End: 2025-08-27
Payer: COMMERCIAL

## (undated) DEVICE — CATH EMPULSE ANGLED 5FR PIGTAI

## (undated) DEVICE — PAD DEFIB CADENCE ADULT R2

## (undated) DEVICE — PAD RADI FEMORAL

## (undated) DEVICE — OMNIPAQUE 350MG 150ML VIAL

## (undated) DEVICE — PACK CATH LAB

## (undated) DEVICE — ANGIOTOUCH KIT

## (undated) DEVICE — CATH DXTERITY JR40 100CM 5FR

## (undated) DEVICE — CATH DXTERITY JL35 100CM 5FR

## (undated) DEVICE — KIT MANIFOLD LOW PRESS TUBING

## (undated) DEVICE — KIT SYR REUSABLE

## (undated) DEVICE — HEMOSTAT VASC BAND REG 24CM

## (undated) DEVICE — WIRE GUIDE SAFE-T-J .035 260CM

## (undated) DEVICE — KIT GLIDESHEATH SLEND 6FR 10CM